# Patient Record
Sex: FEMALE | Race: WHITE | HISPANIC OR LATINO | Employment: FULL TIME | URBAN - METROPOLITAN AREA
[De-identification: names, ages, dates, MRNs, and addresses within clinical notes are randomized per-mention and may not be internally consistent; named-entity substitution may affect disease eponyms.]

---

## 2017-07-11 ENCOUNTER — HOSPITAL ENCOUNTER (EMERGENCY)
Facility: MEDICAL CENTER | Age: 32
End: 2017-07-11
Attending: EMERGENCY MEDICINE
Payer: MEDICAID

## 2017-07-11 ENCOUNTER — APPOINTMENT (OUTPATIENT)
Dept: RADIOLOGY | Facility: MEDICAL CENTER | Age: 32
End: 2017-07-11
Attending: EMERGENCY MEDICINE
Payer: MEDICAID

## 2017-07-11 VITALS
BODY MASS INDEX: 35.62 KG/M2 | RESPIRATION RATE: 14 BRPM | SYSTOLIC BLOOD PRESSURE: 142 MMHG | HEIGHT: 63 IN | TEMPERATURE: 96.5 F | DIASTOLIC BLOOD PRESSURE: 82 MMHG | WEIGHT: 201.06 LBS | OXYGEN SATURATION: 96 % | HEART RATE: 82 BPM

## 2017-07-11 DIAGNOSIS — S80.01XA CONTUSION OF RIGHT KNEE, INITIAL ENCOUNTER: ICD-10-CM

## 2017-07-11 DIAGNOSIS — K42.0 UMBILICAL HERNIA WITH OBSTRUCTION, WITHOUT GANGRENE: ICD-10-CM

## 2017-07-11 LAB
ALBUMIN SERPL BCP-MCNC: 3.7 G/DL (ref 3.2–4.9)
ALBUMIN/GLOB SERPL: 1.3 G/DL
ALP SERPL-CCNC: 81 U/L (ref 30–99)
ALT SERPL-CCNC: 28 U/L (ref 2–50)
ANION GAP SERPL CALC-SCNC: 5 MMOL/L (ref 0–11.9)
APPEARANCE UR: CLEAR
AST SERPL-CCNC: 20 U/L (ref 12–45)
BASOPHILS # BLD AUTO: 0.7 % (ref 0–1.8)
BASOPHILS # BLD: 0.05 K/UL (ref 0–0.12)
BILIRUB SERPL-MCNC: 0.5 MG/DL (ref 0.1–1.5)
BILIRUB UR QL STRIP.AUTO: NEGATIVE
BUN SERPL-MCNC: 6 MG/DL (ref 8–22)
CALCIUM SERPL-MCNC: 9 MG/DL (ref 8.5–10.5)
CHLORIDE SERPL-SCNC: 107 MMOL/L (ref 96–112)
CO2 SERPL-SCNC: 28 MMOL/L (ref 20–33)
COLOR UR: YELLOW
CREAT SERPL-MCNC: 0.48 MG/DL (ref 0.5–1.4)
CULTURE IF INDICATED INDCX: NO UA CULTURE
EOSINOPHIL # BLD AUTO: 0.16 K/UL (ref 0–0.51)
EOSINOPHIL NFR BLD: 2.1 % (ref 0–6.9)
ERYTHROCYTE [DISTWIDTH] IN BLOOD BY AUTOMATED COUNT: 43.1 FL (ref 35.9–50)
GFR SERPL CREATININE-BSD FRML MDRD: >60 ML/MIN/1.73 M 2
GLOBULIN SER CALC-MCNC: 2.9 G/DL (ref 1.9–3.5)
GLUCOSE SERPL-MCNC: 100 MG/DL (ref 65–99)
GLUCOSE UR STRIP.AUTO-MCNC: NEGATIVE MG/DL
HCG SERPL QL: NEGATIVE
HCT VFR BLD AUTO: 36.7 % (ref 37–47)
HGB BLD-MCNC: 12.2 G/DL (ref 12–16)
IMM GRANULOCYTES # BLD AUTO: 0.03 K/UL (ref 0–0.11)
IMM GRANULOCYTES NFR BLD AUTO: 0.4 % (ref 0–0.9)
KETONES UR STRIP.AUTO-MCNC: NEGATIVE MG/DL
LEUKOCYTE ESTERASE UR QL STRIP.AUTO: NEGATIVE
LIPASE SERPL-CCNC: 24 U/L (ref 11–82)
LYMPHOCYTES # BLD AUTO: 2.73 K/UL (ref 1–4.8)
LYMPHOCYTES NFR BLD: 35.6 % (ref 22–41)
MCH RBC QN AUTO: 29.1 PG (ref 27–33)
MCHC RBC AUTO-ENTMCNC: 33.2 G/DL (ref 33.6–35)
MCV RBC AUTO: 87.6 FL (ref 81.4–97.8)
MICRO URNS: NORMAL
MONOCYTES # BLD AUTO: 0.45 K/UL (ref 0–0.85)
MONOCYTES NFR BLD AUTO: 5.9 % (ref 0–13.4)
NEUTROPHILS # BLD AUTO: 4.24 K/UL (ref 2–7.15)
NEUTROPHILS NFR BLD: 55.3 % (ref 44–72)
NITRITE UR QL STRIP.AUTO: NEGATIVE
NRBC # BLD AUTO: 0 K/UL
NRBC BLD AUTO-RTO: 0 /100 WBC
PH UR STRIP.AUTO: 7 [PH]
PLATELET # BLD AUTO: 255 K/UL (ref 164–446)
PMV BLD AUTO: 11.1 FL (ref 9–12.9)
POTASSIUM SERPL-SCNC: 3.8 MMOL/L (ref 3.6–5.5)
PROT SERPL-MCNC: 6.6 G/DL (ref 6–8.2)
PROT UR QL STRIP: NEGATIVE MG/DL
RBC # BLD AUTO: 4.19 M/UL (ref 4.2–5.4)
RBC UR QL AUTO: NEGATIVE
SODIUM SERPL-SCNC: 140 MMOL/L (ref 135–145)
SP GR UR STRIP.AUTO: 1.02
UROBILINOGEN UR STRIP.AUTO-MCNC: 1 MG/DL
WBC # BLD AUTO: 7.7 K/UL (ref 4.8–10.8)

## 2017-07-11 PROCEDURE — 85025 COMPLETE CBC W/AUTO DIFF WBC: CPT

## 2017-07-11 PROCEDURE — 80053 COMPREHEN METABOLIC PANEL: CPT

## 2017-07-11 PROCEDURE — 84703 CHORIONIC GONADOTROPIN ASSAY: CPT

## 2017-07-11 PROCEDURE — 83690 ASSAY OF LIPASE: CPT

## 2017-07-11 PROCEDURE — 99284 EMERGENCY DEPT VISIT MOD MDM: CPT

## 2017-07-11 PROCEDURE — 81003 URINALYSIS AUTO W/O SCOPE: CPT

## 2017-07-11 PROCEDURE — 74177 CT ABD & PELVIS W/CONTRAST: CPT

## 2017-07-11 PROCEDURE — 73564 X-RAY EXAM KNEE 4 OR MORE: CPT | Mod: RT

## 2017-07-11 PROCEDURE — 36415 COLL VENOUS BLD VENIPUNCTURE: CPT

## 2017-07-11 PROCEDURE — 700117 HCHG RX CONTRAST REV CODE 255: Performed by: EMERGENCY MEDICINE

## 2017-07-11 RX ADMIN — IOHEXOL 100 ML: 350 INJECTION, SOLUTION INTRAVENOUS at 13:20

## 2017-07-11 ASSESSMENT — LIFESTYLE VARIABLES: DO YOU DRINK ALCOHOL: NO

## 2017-07-11 ASSESSMENT — PAIN SCALES - GENERAL: PAINLEVEL_OUTOF10: 8

## 2017-07-11 NOTE — ED AVS SNAPSHOT
7/11/2017    Misa Linda Lin  [address]  [place] [address]    Dear Misa:    [facility] wants to ensure your discharge home is safe and you or your loved ones have had all of your questions answered regarding your care after you leave the hospital.    Below is a list of resources and contact information should you have any questions regarding your hospital stay, follow-up instructions, or active medical symptoms.    Questions or Concerns Regarding… Contact   Medical Questions Related to Your Discharge  (7 days a week, 8am-5pm) Contact a Nurse Care Coordinator   [phone]   Medical Questions Not Related to Your Discharge  (24 hours a day / 7 days a week)  Contact the Nurse Health Line   [phone]    Medications or Discharge Instructions Refer to your discharge packet   or contact your [facility] Primary Care Provider   [phone]   Follow-up Appointment(s) Schedule your appointment via [employer]   or contact Scheduling [phone]   Billing Review your statement via [employer]  or contact Billing [phone]   Medical Records Review your records via [employer]   or contact Medical Records [phone]     You may receive a telephone call within two days of discharge. This call is to make certain you understand your discharge instructions and have the opportunity to have any questions answered. You can also easily access your medical information, test results and upcoming appointments via the [employer] free online health management tool. You can learn more and sign up at [employer]. For assistance setting up your [employer] account, please call [phone].    Once again, we want to ensure your discharge home is safe and that you have a clear understanding of any next steps in your care. If you have any questions or concerns, please do not hesitate to contact us, we are here for you. Thank you for choosing [facility] for your healthcare needs.    Sincerely,    Your [facility] Healthcare Team

## 2017-07-11 NOTE — LETTER
July 11, 2017    Patient: Misa Lin   YOB: 1985   Date of Visit: 7/11/2017 to July 11, 2017       To Whom It May Concern:    Misa Lin was seen and treated at a [facility].    She may return to work on 07/12/17.      Sincerely,     Dr. Odalys Mcmahon

## 2017-07-11 NOTE — DISCHARGE INSTRUCTIONS
Hernia, Adult  A hernia is the bulging of an organ or tissue through a weak spot in the muscles of the abdomen (abdominal wall). Hernias develop most often near the navel or groin.  There are many kinds of hernias. Common kinds include:  · Femoral hernia. This kind of hernia develops under the groin in the upper thigh area.  · Inguinal hernia. This kind of hernia develops in the groin or scrotum.  · Umbilical hernia. This kind of hernia develops near the navel.  · Hiatal hernia. This kind of hernia causes part of the stomach to be pushed up into the chest.  · Incisional hernia. This kind of hernia bulges through a scar from an abdominal surgery.  CAUSES  This condition may be caused by:  · Heavy lifting.  · Coughing over a long period of time.  · Straining to have a bowel movement.  · An incision made during an abdominal surgery.  · A birth defect (congenital defect).  · Excess weight or obesity.  · Smoking.  · Poor nutrition.  · Cystic fibrosis.  · Excess fluid in the abdomen.  · Undescended testicles.  SYMPTOMS  Symptoms of a hernia include:  · A lump on the abdomen. This is the first sign of a hernia. The lump may become more obvious with standing, straining, or coughing. It may get bigger over time if it is not treated or if the condition causing it is not treated.  · Pain. A hernia is usually painless, but it may become painful over time if treatment is delayed. The pain is usually dull and may get worse with standing or lifting heavy objects.  Sometimes a hernia gets tightly squeezed in the weak spot (strangulated) or stuck there (incarcerated) and causes additional symptoms. These symptoms may include:  · Vomiting.  · Nausea.  · Constipation.  · Irritability.  DIAGNOSIS  A hernia may be diagnosed with:  · A physical exam. During the exam your health care provider may ask you to cough or to make a specific movement, because a hernia is usually more visible when you move.  · Imaging tests. These can  include:  ¨ X-rays.  ¨ Ultrasound.  ¨ CT scan.  TREATMENT  A hernia that is small and painless may not need to be treated. A hernia that is large or painful may be treated with surgery. Inguinal hernias may be treated with surgery to prevent incarceration or strangulation. Strangulated hernias are always treated with surgery, because lack of blood to the trapped organ or tissue can cause it to die.  Surgery to treat a hernia involves pushing the bulge back into place and repairing the weak part of the abdomen.  HOME CARE INSTRUCTIONS  · Avoid straining.  · Do not lift anything heavier than 10 lb (4.5 kg).  · Lift with your leg muscles, not your back muscles. This helps avoid strain.  · When coughing, try to cough gently.  · Prevent constipation. Constipation leads to straining with bowel movements, which can make a hernia worse or cause a hernia repair to break down. You can prevent constipation by:  ¨ Eating a high-fiber diet that includes plenty of fruits and vegetables.  ¨ Drinking enough fluids to keep your urine clear or pale yellow. Aim to drink 6-8 glasses of water per day.  ¨ Using a stool softener as directed by your health care provider.  · Lose weight, if you are overweight.  · Do not use any tobacco products, including cigarettes, chewing tobacco, or electronic cigarettes. If you need help quitting, ask your health care provider.  · Keep all follow-up visits as directed by your health care provider. This is important. Your health care provider may need to monitor your condition.  SEEK MEDICAL CARE IF:  · You have swelling, redness, and pain in the affected area.  · Your bowel habits change.  SEEK IMMEDIATE MEDICAL CARE IF:  · You have a fever.  · You have abdominal pain that is getting worse.  · You feel nauseous or you vomit.  · You cannot push the hernia back in place by gently pressing on it while you are lying down.  · The hernia:  ¨ Changes in shape or size.  ¨ Is stuck outside the  abdomen.  ¨ Becomes discolored.  ¨ Feels hard or tender.     This information is not intended to replace advice given to you by your health care provider. Make sure you discuss any questions you have with your health care provider.     Document Released: 12/18/2006 Document Revised: 01/08/2016 Document Reviewed: 10/28/2015  [employer] Interactive Patient Education ©2016 [employer]    Contusion  A contusion is a deep bruise. Contusions are the result of an injury that caused bleeding under the skin. The contusion may turn blue, purple, or yellow. Minor injuries will give you a painless contusion, but more severe contusions may stay painful and swollen for a few weeks.   CAUSES   A contusion is usually caused by a blow, trauma, or direct force to an area of the body.  SYMPTOMS   · Swelling and redness of the injured area.  · Bruising of the injured area.  · Tenderness and soreness of the injured area.  · Pain.  DIAGNOSIS   The diagnosis can be made by taking a history and physical exam. An X-ray, CT scan, or MRI may be needed to determine if there were any associated injuries, such as fractures.  TREATMENT   Specific treatment will depend on what area of the body was injured. In general, the best treatment for a contusion is resting, icing, elevating, and applying cold compresses to the injured area. Over-the-counter medicines may also be recommended for pain control. Ask your caregiver what the best treatment is for your contusion.  HOME CARE INSTRUCTIONS   · Put ice on the injured area.  ¨ Put ice in a plastic bag.  ¨ Place a towel between your skin and the bag.  ¨ Leave the ice on for 15-20 minutes, 3-4 times a day, or as directed by your health care provider.  · Only take over-the-counter or prescription medicines for pain, discomfort, or fever as directed by your caregiver. Your caregiver may recommend avoiding anti-inflammatory medicines (aspirin, ibuprofen, and naproxen) for 48 hours because these medicines  may increase bruising.  · Rest the injured area.  · If possible, elevate the injured area to reduce swelling.  SEEK IMMEDIATE MEDICAL CARE IF:   · You have increased bruising or swelling.  · You have pain that is getting worse.  · Your swelling or pain is not relieved with medicines.  MAKE SURE YOU:   · Understand these instructions.  · Will watch your condition.  · Will get help right away if you are not doing well or get worse.     This information is not intended to replace advice given to you by your health care provider. Make sure you discuss any questions you have with your health care provider.     Document Released: 09/27/2006 Document Revised: 12/23/2014 Document Reviewed: 10/22/2012  [employer] Interactive Patient Education ©2016 [employer] Inc.

## 2017-07-11 NOTE — ED AVS SNAPSHOT
Home Care Instructions                                                                                                                Misa Lin   MRN: [id number]    Department:  [facility], Emergency Dept   Date of Visit:  7/11/2017            [facility], Emergency Dept    [address]    [place] [address]    Phone:  [phone]      You were seen by     Phoenix Ptit M.D.      Your Diagnosis Was     Contusion of right knee, initial encounter     S80.01XA       These are the medications you received during your hospitalization from 07/11/2017 0943 to 07/11/2017 1351     Date/Time Order Dose Route Action    07/11/2017 1320 iohexol (OMNIPAQUE) 350 mg/mL 100 mL Intravenous Given      Follow-up Information     1. Follow up with Neri Huggins M.D..    Specialties:  Surgery, Radiology    Why:  As needed for follow-up of umbilical hernia    Contact information    Zainab Watters [facility] [address]  [place] [id number] [phone]        Medication Information     Review all of your home medications and newly ordered medications with your primary doctor and/or pharmacist as soon as possible. Follow medication instructions as directed by your doctor and/or pharmacist.     Please keep your complete medication list with you and share with your physician. Update the information when medications are discontinued, doses are changed, or new medications (including over-the-counter products) are added; and carry medication information at all times in the event of emergency situations.               Medication List      Notice     You have not been prescribed any medications.            Procedures and tests performed during your visit     CBC WITH DIFFERENTIAL    COMP METABOLIC PANEL    CONSENT FOR CONTRAST INJECTION    CT-ABDOMEN-PELVIS WITH    DX-KNEE COMPLETE 4+ RIGHT    ESTIMATED GFR    HCG QUAL SERUM    LIPASE    SALINE LOCK    URINALYSIS,CULTURE IF INDICATED             Discharge Instructions       Hernia, Adult  A hernia is the bulging of an organ or tissue through a weak spot in the muscles of the abdomen (abdominal wall). Hernias develop most often near the navel or groin.  There are many kinds of hernias. Common kinds include:  · Femoral hernia. This kind of hernia develops under the groin in the upper thigh area.  · Inguinal hernia. This kind of hernia develops in the groin or scrotum.  · Umbilical hernia. This kind of hernia develops near the navel.  · Hiatal hernia. This kind of hernia causes part of the stomach to be pushed up into the chest.  · Incisional hernia. This kind of hernia bulges through a scar from an abdominal surgery.  CAUSES  This condition may be caused by:  · Heavy lifting.  · Coughing over a long period of time.  · Straining to have a bowel movement.  · An incision made during an abdominal surgery.  · A birth defect (congenital defect).  · Excess weight or obesity.  · Smoking.  · Poor nutrition.  · Cystic fibrosis.  · Excess fluid in the abdomen.  · Undescended testicles.  SYMPTOMS  Symptoms of a hernia include:  · A lump on the abdomen. This is the first sign of a hernia. The lump may become more obvious with standing, straining, or coughing. It may get bigger over time if it is not treated or if the condition causing it is not treated.  · Pain. A hernia is usually painless, but it may become painful over time if treatment is delayed. The pain is usually dull and may get worse with standing or lifting heavy objects.  Sometimes a hernia gets tightly squeezed in the weak spot (strangulated) or stuck there (incarcerated) and causes additional symptoms. These symptoms may include:  · Vomiting.  · Nausea.  · Constipation.  · Irritability.  DIAGNOSIS  A hernia may be diagnosed with:  · A physical exam. During the exam your health care provider may ask you to cough or to make a specific movement, because a hernia is usually more visible when you  move.  · Imaging tests. These can include:  ¨ X-rays.  ¨ Ultrasound.  ¨ CT scan.  TREATMENT  A hernia that is small and painless may not need to be treated. A hernia that is large or painful may be treated with surgery. Inguinal hernias may be treated with surgery to prevent incarceration or strangulation. Strangulated hernias are always treated with surgery, because lack of blood to the trapped organ or tissue can cause it to die.  Surgery to treat a hernia involves pushing the bulge back into place and repairing the weak part of the abdomen.  HOME CARE INSTRUCTIONS  · Avoid straining.  · Do not lift anything heavier than 10 lb (4.5 kg).  · Lift with your leg muscles, not your back muscles. This helps avoid strain.  · When coughing, try to cough gently.  · Prevent constipation. Constipation leads to straining with bowel movements, which can make a hernia worse or cause a hernia repair to break down. You can prevent constipation by:  ¨ Eating a high-fiber diet that includes plenty of fruits and vegetables.  ¨ Drinking enough fluids to keep your urine clear or pale yellow. Aim to drink 6-8 glasses of water per day.  ¨ Using a stool softener as directed by your health care provider.  · Lose weight, if you are overweight.  · Do not use any tobacco products, including cigarettes, chewing tobacco, or electronic cigarettes. If you need help quitting, ask your health care provider.  · Keep all follow-up visits as directed by your health care provider. This is important. Your health care provider may need to monitor your condition.  SEEK MEDICAL CARE IF:  · You have swelling, redness, and pain in the affected area.  · Your bowel habits change.  SEEK IMMEDIATE MEDICAL CARE IF:  · You have a fever.  · You have abdominal pain that is getting worse.  · You feel nauseous or you vomit.  · You cannot push the hernia back in place by gently pressing on it while you are lying down.  · The hernia:  ¨ Changes in shape or size.  ¨ Is  stuck outside the abdomen.  ¨ Becomes discolored.  ¨ Feels hard or tender.     This information is not intended to replace advice given to you by your health care provider. Make sure you discuss any questions you have with your health care provider.     Document Released: 12/18/2006 Document Revised: 01/08/2016 Document Reviewed: 10/28/2015  [employer] Interactive Patient Education ©2016 [employer]    Contusion  A contusion is a deep bruise. Contusions are the result of an injury that caused bleeding under the skin. The contusion may turn blue, purple, or yellow. Minor injuries will give you a painless contusion, but more severe contusions may stay painful and swollen for a few weeks.   CAUSES   A contusion is usually caused by a blow, trauma, or direct force to an area of the body.  SYMPTOMS   · Swelling and redness of the injured area.  · Bruising of the injured area.  · Tenderness and soreness of the injured area.  · Pain.  DIAGNOSIS   The diagnosis can be made by taking a history and physical exam. An X-ray, CT scan, or MRI may be needed to determine if there were any associated injuries, such as fractures.  TREATMENT   Specific treatment will depend on what area of the body was injured. In general, the best treatment for a contusion is resting, icing, elevating, and applying cold compresses to the injured area. Over-the-counter medicines may also be recommended for pain control. Ask your caregiver what the best treatment is for your contusion.  HOME CARE INSTRUCTIONS   · Put ice on the injured area.  ¨ Put ice in a plastic bag.  ¨ Place a towel between your skin and the bag.  ¨ Leave the ice on for 15-20 minutes, 3-4 times a day, or as directed by your health care provider.  · Only take over-the-counter or prescription medicines for pain, discomfort, or fever as directed by your caregiver. Your caregiver may recommend avoiding anti-inflammatory medicines (aspirin, ibuprofen, and naproxen) for 48 hours because  these medicines may increase bruising.  · Rest the injured area.  · If possible, elevate the injured area to reduce swelling.  SEEK IMMEDIATE MEDICAL CARE IF:   · You have increased bruising or swelling.  · You have pain that is getting worse.  · Your swelling or pain is not relieved with medicines.  MAKE SURE YOU:   · Understand these instructions.  · Will watch your condition.  · Will get help right away if you are not doing well or get worse.     This information is not intended to replace advice given to you by your health care provider. Make sure you discuss any questions you have with your health care provider.     Document Released: 09/27/2006 Document Revised: 12/23/2014 Document Reviewed: 10/22/2012  [employer] Interactive Patient Education ©2016 [employer].            Patient Information     Patient Information    Following emergency treatment: all patient requiring follow-up care must return either to a private physician or a clinic if your condition worsens before you are able to obtain further medical attention, please return to the emergency room.     Billing Information    At [facility], we work to make the billing process streamlined for our patients.  Our Representatives are here to answer any questions you may have regarding your hospital bill.  If you have insurance coverage and have supplied your insurance information to us, we will submit a claim to your insurer on your behalf.  Should you have any questions regarding your bill, we can be reached online or by phone as follows:  Online: You are able pay your bills online or live chat with our representatives about any billing questions you may have. We are here to help Monday - Friday from 8:00am to 7:30pm and 9:00am - 12:00pm on Saturdays.  Please visit [email or web address]  for more information.   Phone:  [phone] or [phone]    Please note that your emergency physician, surgeon, pathologist,  radiologist, anesthesiologist, and other specialists are not employed by [facility] and will therefore bill separately for their services.  Please contact them directly for any questions concerning their bills at the numbers below:     Emergency Physician Services:  [phone]  [facility]:  [phone]  [facility]:  [phone]  [facility]:  [phone]    1. Your final bill may vary from the amount quoted upon discharge if all procedures are not complete at that time, or if your doctor has additional procedures of which we are not aware. You will receive an additional bill if you return to the Emergency Department at [facility] for suture removal regardless of the facility of which the sutures were placed.     2. Please arrange for settlement of this account at the emergency registration.    3. All self-pay accounts are due in full at the time of treatment.  If you are unable to meet this obligation then payment is expected within 4-5 days.     4. If you have had radiology studies (CT, X-ray, Ultrasound, MRI), you have received a preliminary result during your emergency department visit. Please contact the radiology department [phone] to receive a copy of your final result. Please discuss the Final result with your primary physician or with the follow up physician provided.     Crisis Hotline:  [place] Crisis Hotline:  4-903-CEDWPWL or 1-941.416.8361  [place] Crisis Hotline:    1-646.415.7028 or 599-385-5548         ED Discharge Follow Up Questions    1. In order to provide you with very good care, we would like to follow up with a phone call in the next few days.  May we have your permission to contact you?     YES /  NO    2. What is the best phone number to call you? (       )_____-__________    3. What is the best time to call you?      Morning  /  Afternoon  /  Evening                   Patient Signature:   ____________________________________________________________    Date:  ____________________________________________________________

## 2017-07-11 NOTE — ED AVS SNAPSHOT
[employer] Access Code: [email or web address]  Expires: 8/10/2017  1:50 PM    [employer]  A secure, online tool to manage your health information     [employer]s [employer]® is a secure, online tool that connects you to your personalized health information from the privacy of your home -- day or night - making it very easy for you to manage your healthcare. Once the activation process is completed, you can even access your medical information using the [employer] mary, which is available for free in the [employer] Mary store or Google Play store.     [employer] provides the following levels of access (as shown below):   My Chart Features   [facility] Primary Care Doctor [facility]  Specialists [facility]  Urgent  Care Non-[facility]  Primary Care  Doctor   Email your healthcare team securely and privately 24/7 X X X X   Manage appointments: schedule your next appointment; view details of past/upcoming appointments X      Request prescription refills. X      View recent personal medical records, including lab and immunizations X X X X   View health record, including health history, allergies, medications X X X X   Read reports about your outpatient visits, procedures, consult and ER notes X X X X   See your discharge summary, which is a recap of your hospital and/or ER visit that includes your diagnosis, lab results, and care plan. X X       How to register for [employer]:  1. Go to  [email or web address].  2. Click on the Sign Up Now box, which takes you to the New Member Sign Up page. You will need to provide the following information:  a. Enter your [employer] Access Code exactly as it appears at the top of this page. (You will not need to use this code after you’ve completed the sign-up process. If you do not sign up before the expiration date, you must request a new code.)   b. Enter your date of birth.   c. Enter your home email address.   d. Click Submit, and follow the next screen’s instructions.  3. Create a [employer] ID. This will be your [employer]  login ID and cannot be changed, so think of one that is secure and easy to remember.  4. Create a [employer] password. You can change your password at any time.  5. Enter your Password Reset Question and Answer. This can be used at a later time if you forget your password.   6. Enter your e-mail address. This allows you to receive e-mail notifications when new information is available in [employer].  7. Click Sign Up. You can now view your health information.    For assistance activating your [employer] account, call [phone]

## 2017-07-11 NOTE — ED NOTES
Chief Complaint   Patient presents with   • Abdominal Pain     Pt BIB self to triage, pt reports mid/left abd pain, pt reports hx of hernia in past, pt denies N/V/D.    • Knee Pain     Pt reports GLF/tripped of Fri, right knee pain.     Explained to pt triage process, made pt aware to tell this RN of any changes/concerns, pt verbalized understanding of process and instructions given. Pt to ER lobby.

## 2017-07-11 NOTE — ED NOTES
All lines and monitors d/c'd. Discharge paperwork and medications reviewed. Pt states she understands and had no questions. Pt encouraged to follow-up with PCP and come back to ER with worsening symptoms.  Pt verbalizes understanding. Pt ambulated out of ED with steady gait.
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
## 2017-07-14 ENCOUNTER — HOSPITAL ENCOUNTER (EMERGENCY)
Facility: MEDICAL CENTER | Age: 32
End: 2017-07-14
Attending: EMERGENCY MEDICINE
Payer: MEDICAID

## 2017-07-14 VITALS
DIASTOLIC BLOOD PRESSURE: 90 MMHG | WEIGHT: 197.53 LBS | HEART RATE: 85 BPM | TEMPERATURE: 98.4 F | OXYGEN SATURATION: 96 % | RESPIRATION RATE: 18 BRPM | SYSTOLIC BLOOD PRESSURE: 135 MMHG | BODY MASS INDEX: 35 KG/M2

## 2017-07-14 DIAGNOSIS — Z23 DIPHTHERIA-TETANUS-PERTUSSIS (DTP) VACCINATION: ICD-10-CM

## 2017-07-14 DIAGNOSIS — S61.411A LACERATION OF RIGHT HAND WITHOUT FOREIGN BODY, INITIAL ENCOUNTER: ICD-10-CM

## 2017-07-14 PROCEDURE — 700101 HCHG RX REV CODE 250: Performed by: EMERGENCY MEDICINE

## 2017-07-14 PROCEDURE — 303485 HCHG DRESSING MEDIUM

## 2017-07-14 PROCEDURE — 700111 HCHG RX REV CODE 636 W/ 250 OVERRIDE (IP): Performed by: EMERGENCY MEDICINE

## 2017-07-14 PROCEDURE — 304217 HCHG IRRIGATION SYSTEM

## 2017-07-14 PROCEDURE — 90471 IMMUNIZATION ADMIN: CPT

## 2017-07-14 PROCEDURE — 90715 TDAP VACCINE 7 YRS/> IM: CPT | Performed by: EMERGENCY MEDICINE

## 2017-07-14 PROCEDURE — 303747 HCHG EXTRA SUTURE

## 2017-07-14 PROCEDURE — 99283 EMERGENCY DEPT VISIT LOW MDM: CPT

## 2017-07-14 PROCEDURE — 304999 HCHG REPAIR-SIMPLE/INTERMED LEVEL 1

## 2017-07-14 RX ORDER — LIDOCAINE HYDROCHLORIDE 10 MG/ML
20 INJECTION, SOLUTION INFILTRATION; PERINEURAL ONCE
Status: COMPLETED | OUTPATIENT
Start: 2017-07-14 | End: 2017-07-14

## 2017-07-14 RX ADMIN — LIDOCAINE HYDROCHLORIDE 20 ML: 10 INJECTION, SOLUTION INFILTRATION; PERINEURAL at 10:27

## 2017-07-14 RX ADMIN — CLOSTRIDIUM TETANI TOXOID ANTIGEN (FORMALDEHYDE INACTIVATED), CORYNEBACTERIUM DIPHTHERIAE TOXOID ANTIGEN (FORMALDEHYDE INACTIVATED), BORDETELLA PERTUSSIS TOXOID ANTIGEN (GLUTARALDEHYDE INACTIVATED), BORDETELLA PERTUSSIS FILAMENTOUS HEMAGGLUTININ ANTIGEN (FORMALDEHYDE INACTIVATED), BORDETELLA PERTUSSIS PERTACTIN ANTIGEN, AND BORDETELLA PERTUSSIS FIMBRIAE 2/3 ANTIGEN 0.5 ML: 5; 2; 2.5; 5; 3; 5 INJECTION, SUSPENSION INTRAMUSCULAR at 10:47

## 2017-07-14 ASSESSMENT — ENCOUNTER SYMPTOMS
FALLS: 0
FEVER: 0
VOMITING: 0

## 2017-07-14 ASSESSMENT — LIFESTYLE VARIABLES: DO YOU DRINK ALCOHOL: NO

## 2017-07-14 NOTE — ED AVS SNAPSHOT
NudgeRx Access Code: [email or web address]  Expires: 8/10/2017  1:50 PM    NudgeRx  A secure, online tool to manage your health information     [employer]’s NudgeRx® is a secure, online tool that connects you to your personalized health information from the privacy of your home -- day or night - making it very easy for you to manage your healthcare. Once the activation process is completed, you can even access your medical information using the NudgeRx mary, which is available for free in the [employer] Mary store or Google Play store.     NudgeRx provides the following levels of access (as shown below):   My Chart Features   [facility] Primary Care Doctor [facility]  Specialists [facility]  Urgent  Care Non-[facility]  Primary Care  Doctor   Email your healthcare team securely and privately 24/7 X X X X   Manage appointments: schedule your next appointment; view details of past/upcoming appointments X      Request prescription refills. X      View recent personal medical records, including lab and immunizations X X X X   View health record, including health history, allergies, medications X X X X   Read reports about your outpatient visits, procedures, consult and ER notes X X X X   See your discharge summary, which is a recap of your hospital and/or ER visit that includes your diagnosis, lab results, and care plan. X X       How to register for NudgeRx:  1. Go to  [email or web address].  2. Click on the Sign Up Now box, which takes you to the New Member Sign Up page. You will need to provide the following information:  a. Enter your NudgeRx Access Code exactly as it appears at the top of this page. (You will not need to use this code after you’ve completed the sign-up process. If you do not sign up before the expiration date, you must request a new code.)   b. Enter your date of birth.   c. Enter your home email address.   d. Click Submit, and follow the next screen’s instructions.  3. Create a NudgeRx ID. This will be your NudgeRx  login ID and cannot be changed, so think of one that is secure and easy to remember.  4. Create a [employer] password. You can change your password at any time.  5. Enter your Password Reset Question and Answer. This can be used at a later time if you forget your password.   6. Enter your e-mail address. This allows you to receive e-mail notifications when new information is available in [employer].  7. Click Sign Up. You can now view your health information.    For assistance activating your [employer] account, call [phone]

## 2017-07-14 NOTE — ED NOTES
Discharge instructions given to patient; patient verbalizes understanding, all questions answered.  Copy of DC summary provided, signed copy in chart.  Pt states personal belongings are in possession.  Pt escorted to ED lobby without incident.

## 2017-07-14 NOTE — ED AVS SNAPSHOT
Home Care Instructions                                                                                                                Misa Lin   MRN: [id number]    Department:  [facility], Emergency Dept   Date of Visit:  7/14/2017            [facility], Emergency Dept    [address] [place] [id number]    Phone:  [phone]      You were seen by     Isadora Cuevas D.O.      Your Diagnosis Was     Laceration of right hand without foreign body, initial encounter     S61.411A dorsum      These are the medications you received during your hospitalization from 07/14/2017 0927 to 07/14/2017 1055     Date/Time Order Dose Route Action    07/14/2017 1027 lidocaine (XYLOCAINE) 1%  injection 20 mL Infiltration Given    07/14/2017 1047 tetanus-dipth-acell pertussis (ADACEL) inj 0.5 mL 0.5 mL Intramuscular Given      Follow-up Information     1. Follow up with Your Physician In 2 days.    Specialty:  Emergency Medicine    Why:  for wound check    Contact information    Varies          2. Follow up with [facility], Emergency Dept.    Specialty:  Emergency Medicine    Why:  If symptoms worsen    Contact information    [address] [place] [id number] [phone]      Medication Information     Review all of your home medications and newly ordered medications with your primary doctor and/or pharmacist as soon as possible. Follow medication instructions as directed by your doctor and/or pharmacist.     Please keep your complete medication list with you and share with your physician. Update the information when medications are discontinued, doses are changed, or new medications (including over-the-counter products) are added; and carry medication information at all times in the event of emergency situations.               Medication List      Notice     You have not been prescribed any medications.              Discharge Instructions          Sutures will need to be removed in about 7 days.    Laceration Care, Adult  A laceration is a cut that goes through all of the layers of the skin and into the tissue that is right under the skin. Some lacerations heal on their own. Others need to be closed with stitches (sutures), staples, skin adhesive strips, or skin glue. Proper laceration care minimizes the risk of infection and helps the laceration to heal better.  HOW TO CARE FOR YOUR LACERATION  If sutures or staples were used:  · Keep the wound clean and dry.  · If you were given a bandage (dressing), you should change it at least one time per day or as told by your health care provider. You should also change it if it becomes wet or dirty.  · Keep the wound completely dry for the first 24 hours or as told by your health care provider. After that time, you may shower or bathe. However, make sure that the wound is not soaked in water until after the sutures or staples have been removed.  · Clean the wound one time each day or as told by your health care provider:  ¨ Wash the wound with soap and water.  ¨ Rinse the wound with water to remove all soap.  ¨ Pat the wound dry with a clean towel. Do not rub the wound.  · After cleaning the wound, apply a thin layer of antibiotic ointment as told by your health care provider. This will help to prevent infection and keep the dressing from sticking to the wound.  · Have the sutures or staples removed as told by your health care provider.  If skin adhesive strips were used:  · Keep the wound clean and dry.  · If you were given a bandage (dressing), you should change it at least one time per day or as told by your health care provider. You should also change it if it becomes dirty or wet.  · Do not get the skin adhesive strips wet. You may shower or bathe, but be careful to keep the wound dry.  · If the wound gets wet, pat it dry with a clean towel. Do not rub the wound.  · Skin adhesive strips fall off on their own.  You may trim the strips as the wound heals. Do not remove skin adhesive strips that are still stuck to the wound. They will fall off in time.  If skin glue was used:  · Try to keep the wound dry, but you may briefly wet it in the shower or bath. Do not soak the wound in water, such as by swimming.  · After you have showered or bathed, gently pat the wound dry with a clean towel. Do not rub the wound.  · Do not do any activities that will make you sweat heavily until the skin glue has fallen off on its own.  · Do not apply liquid, cream, or ointment medicine to the wound while the skin glue is in place. Using those may loosen the film before the wound has healed.  · If you were given a bandage (dressing), you should change it at least one time per day or as told by your health care provider. You should also change it if it becomes dirty or wet.  · If a dressing is placed over the wound, be careful not to apply tape directly over the skin glue. Doing that may cause the glue to be pulled off before the wound has healed.  · Do not pick at the glue. The skin glue usually remains in place for 5-10 days, then it falls off of the skin.  General Instructions  · Take over-the-counter and prescription medicines only as told by your health care provider.  · If you were prescribed an antibiotic medicine or ointment, take or apply it as told by your doctor. Do not stop using it even if your condition improves.  · To help prevent scarring, make sure to cover your wound with sunscreen whenever you are outside after stitches are removed, after adhesive strips are removed, or when glue remains in place and the wound is healed. Make sure to wear a sunscreen of at least 30 SPF.  · Do not scratch or pick at the wound.  · Keep all follow-up visits as told by your health care provider. This is important.  · Check your wound every day for signs of infection. Watch for:  ¨ Redness, swelling, or pain.  ¨ Fluid, blood, or pus.  · Raise  (elevate) the injured area above the level of your heart while you are sitting or lying down, if possible.  SEEK MEDICAL CARE IF:  · You received a tetanus shot and you have swelling, severe pain, redness, or bleeding at the injection site.  · You have a fever.  · A wound that was closed breaks open.  · You notice a bad smell coming from your wound or your dressing.  · You notice something coming out of the wound, such as wood or glass.  · Your pain is not controlled with medicine.  · You have increased redness, swelling, or pain at the site of your wound.  · You have fluid, blood, or pus coming from your wound.  · You notice a change in the color of your skin near your wound.  · You need to change the dressing frequently due to fluid, blood, or pus draining from the wound.  · You develop a new rash.  · You develop numbness around the wound.  SEEK IMMEDIATE MEDICAL CARE IF:  · You develop severe swelling around the wound.  · Your pain suddenly increases and is severe.  · You develop painful lumps near the wound or on skin that is anywhere on your body.  · You have a red streak going away from your wound.  · The wound is on your hand or foot and you cannot properly move a finger or toe.  · The wound is on your hand or foot and you notice that your fingers or toes look pale or bluish.     This information is not intended to replace advice given to you by your health care provider. Make sure you discuss any questions you have with your health care provider.     Document Released: 12/18/2006 Document Revised: 05/03/2016 Document Reviewed: 12/14/2015  [employer] Interactive Patient Education ©2016 [employer]            Patient Information     Patient Information    Following emergency treatment: all patient requiring follow-up care must return either to a private physician or a clinic if your condition worsens before you are able to obtain further medical attention, please return to the emergency room.     Billing  Information    At [facility], we work to make the billing process streamlined for our patients.  Our Representatives are here to answer any questions you may have regarding your hospital bill.  If you have insurance coverage and have supplied your insurance information to us, we will submit a claim to your insurer on your behalf.  Should you have any questions regarding your bill, we can be reached online or by phone as follows:  Online: You are able pay your bills online or live chat with our representatives about any billing questions you may have. We are here to help Monday - Friday from 8:00am to 7:30pm and 9:00am - 12:00pm on Saturdays.  Please visit [email or web address]  for more information.   Phone:  [phone] or [phone]    Please note that your emergency physician, surgeon, pathologist, radiologist, anesthesiologist, and other specialists are not employed by [facility] and will therefore bill separately for their services.  Please contact them directly for any questions concerning their bills at the numbers below:     Emergency Physician Services:  [phone]  [facility]:  [phone]  [facility]:  [phone]  [facility]:  [phone]    1. Your final bill may vary from the amount quoted upon discharge if all procedures are not complete at that time, or if your doctor has additional procedures of which we are not aware. You will receive an additional bill if you return to the Emergency Department at [facility] for suture removal regardless of the facility of which the sutures were placed.     2. Please arrange for settlement of this account at the emergency registration.    3. All self-pay accounts are due in full at the time of treatment.  If you are unable to meet this obligation then payment is expected within 4-5 days.     4. If you have had radiology studies (CT, X-ray, Ultrasound, MRI), you have  received a preliminary result during your emergency department visit. Please contact the radiology department [phone] to receive a copy of your final result. Please discuss the Final result with your primary physician or with the follow up physician provided.     Crisis Hotline:  [place] Crisis Hotline:  5-570-AMAIKPN or 1-485.171.7485  [place] Crisis Hotline:    1-106.260.6799 or 961-477-3026         ED Discharge Follow Up Questions    1. In order to provide you with very good care, we would like to follow up with a phone call in the next few days.  May we have your permission to contact you?     YES /  NO    2. What is the best phone number to call you? (       )_____-__________    3. What is the best time to call you?      Morning  /  Afternoon  /  Evening                   Patient Signature:  ____________________________________________________________    Date:  ____________________________________________________________

## 2017-07-14 NOTE — ED PROVIDER NOTES
ED Provider Note    Scribed for Isadora Cuevas D.O. by Jose Flores. 7/14/2017, 10:16 AM.    Primary care provider: [facility]  Means of arrival: walk-in  History obtained from: patient  History limited by: none    CHIEF COMPLAINT  Chief Complaint   Patient presents with   • Hand Laceration     top of R hand, cut it on a broken window       HPI  Misa Lin is a 31 y.o. Right handed female who presents to the Emergency Department for evaluation of a right hand laceration, with associated right hand pain, onset less than 30 minutes prior to arrival. She reports she was using a key to knock on a window when the glass broke causing her hand to go through the window. She has no other injuries or complaints at this time.  Her tetanus vaccination is not up to date, despite traige note, she is not sure is was given 3 years ago when she delivered. She has no exacerbating or alleviating factors.     REVIEW OF SYSTEMS  Review of Systems   Constitutional: Negative for fever.   Cardiovascular: Negative for chest pain.   Gastrointestinal: Negative for vomiting.   Musculoskeletal: Negative for falls.        Positive right hand pain   E.     PAST MEDICAL HISTORY   has a past medical history of Anemia (2006,2009); GERD (gastroesophageal reflux disease); and Blood transfusion (2006).    SURGICAL HISTORY   has past surgical history that includes other; abdominal exploration; primary c section; repeat c section (6/11/2014); and umbilical hernia repair (3/10/2015).    SOCIAL HISTORY  Social History   Substance Use Topics   • Smoking status: Never Smoker    • Smokeless tobacco: Never Used   • Alcohol Use: No      History   Drug Use No       FAMILY HISTORY  Family History   Problem Relation Age of Onset   • Hypertension Mother    • Diabetes Mother    • Other Mother        CURRENT MEDICATIONS  Home Medications     Reviewed by Dean Guerrero R.N. (Registered Nurse) on 07/14/17 at 0954  Med List Status: Not  Addressed    Medication Last Dose Status          Patient Jeovany Taking any Medications                        ALLERGIES  Allergies   Allergen Reactions   • Shrimp Flavor Anaphylaxis       PHYSICAL EXAM  VITAL SIGNS: /98 mmHg  Pulse 91  Resp 16  Wt 89.6 kg (197 lb 8.5 oz)  SpO2 96%  LMP 07/10/2017  Breastfeeding? Unknown  Vitals reviewed.  Constitutional: Patient is oriented to person, place, and time. Appears well-developed and well-nourished. No distress.    Cardiovascular: Normal rate, regular rhythm and normal heart sounds. Normal peripheral pulses.  Pulmonary/Chest: Effort normal and breath sounds normal. No respiratory distress, no wheezes, rhonchi, or rales.  Musculoskeletal: Minor proximal lacerations 5th finger and distal index finger. Laceration to dorsum of right hand over 4th and 5th MCs. No functional deficits. No visualized tendons. No foreign bodies. No edema.   Skin: Skin is warm and dry. No erythema. No pallor.   Psychiatric: Patient has a normal mood and affect.     DIAGNOSTICS/PROCEDURES  Laceration Repair Procedure Note    Indication: Laceration    Procedure: The patient was placed in the appropriate position and anesthesia around the laceration was obtained by infiltration using 1% Lidocaine without epinephrine. The area was then cleansed using normal saline solution. No foreign body identified. The laceration was closed with 5-0 Ethilon using interrupted sutures. There were no additional lacerations requiring repair. The wound area was then dressed with a bandage.      Total repaired wound length: 3 cm.     Other Items: Suture count: 6    The patient tolerated the procedure well.    Complications: None    COURSE & MEDICAL DECISION MAKING  Pertinent Labs & Imaging studies reviewed. (See chart for details)    Obtained and reviewed past medical records from delivery in 2014, but unable to see record of Tdap given.     10:16 AM - Patient seen and examined at bedside. Patient will be  treated with Xylocaine 1%, Adacel. Will numb patient's right hand, clean and repair. Patient's tendons appear normal. She understands and is agreeable.     10:28 AM performed laceration procedure. Please see above procedure note for details. Patient's given laceration care instructions. She was given information/instructions about hand lacerations. She is to follow up with her physician in 2 days. Patient understands the plan of care and is agreeable. She agrees to be discharged home.     The patient will return for new or worsening symptoms and is stable at the time of discharge.    Please follow up with a primary physician for blood pressure management, diabetic screening, and all other preventive health concerns.     DISPOSITION:  Patient will be discharged home in stable condition.    FOLLOW UP:  Your Physician  Varies    In 2 days  for wound check    [facility], Emergency Dept  [address] [place] [id number] [phone]    If symptoms worsen      OUTPATIENT MEDICATIONS:  There are no discharge medications for this patient.          FINAL IMPRESSION  1. Laceration of right hand without foreign body, initial encounter    2. Diphtheria-tetanus-pertussis (DTP) vaccination          Jose MORALES (Keri), am scribing for, and in the presence of, Isadora Cuevas D.O..    Electronically signed by: Jose Flores (Keri), 7/14/2017    Isadora MORALES D.O. personally performed the services described in this documentation, as scribed by Jose Flores in my presence, and it is both accurate and complete.    The note accurately reflects work and decisions made by me.  Isadora Cuevas  7/14/2017  1:20 PM

## 2017-07-14 NOTE — ED AVS SNAPSHOT
7/14/2017    Misa Linda Lin  [address]  [place] [address]    Dear Misa:    [facility] wants to ensure your discharge home is safe and you or your loved ones have had all of your questions answered regarding your care after you leave the hospital.    Below is a list of resources and contact information should you have any questions regarding your hospital stay, follow-up instructions, or active medical symptoms.    Questions or Concerns Regarding… Contact   Medical Questions Related to Your Discharge  (7 days a week, 8am-5pm) Contact a Nurse Care Coordinator   [phone]   Medical Questions Not Related to Your Discharge  (24 hours a day / 7 days a week)  Contact the Nurse Health Line   [phone]    Medications or Discharge Instructions Refer to your discharge packet   or contact your [facility] Primary Care Provider   [phone]   Follow-up Appointment(s) Schedule your appointment via [employer]   or contact Scheduling [phone]   Billing Review your statement via [employer]  or contact Billing [phone]   Medical Records Review your records via [employer]   or contact Medical Records [phone]     You may receive a telephone call within two days of discharge. This call is to make certain you understand your discharge instructions and have the opportunity to have any questions answered. You can also easily access your medical information, test results and upcoming appointments via the [employer] free online health management tool. You can learn more and sign up at [employer]. For assistance setting up your [employer] account, please call [phone].    Once again, we want to ensure your discharge home is safe and that you have a clear understanding of any next steps in your care. If you have any questions or concerns, please do not hesitate to contact us, we are here for you. Thank you for choosing [facility] for your healthcare needs.    Sincerely,    Your [facility] Healthcare Team

## 2017-07-14 NOTE — ED NOTES
Laceration wrapped at bedside, informed pt how to clean it and wrap on her own and check for signs of infection. Pt understood.

## 2017-07-14 NOTE — ED NOTES
Ambulates to triage  Chief Complaint   Patient presents with   • Hand Laceration     top of R hand, cut it on a broken window     Gauze drsg applied.  Last tetanus shot was 3 years ago.

## 2017-07-14 NOTE — DISCHARGE INSTRUCTIONS
Sutures will need to be removed in about 7 days.    Laceration Care, Adult  A laceration is a cut that goes through all of the layers of the skin and into the tissue that is right under the skin. Some lacerations heal on their own. Others need to be closed with stitches (sutures), staples, skin adhesive strips, or skin glue. Proper laceration care minimizes the risk of infection and helps the laceration to heal better.  HOW TO CARE FOR YOUR LACERATION  If sutures or staples were used:  · Keep the wound clean and dry.  · If you were given a bandage (dressing), you should change it at least one time per day or as told by your health care provider. You should also change it if it becomes wet or dirty.  · Keep the wound completely dry for the first 24 hours or as told by your health care provider. After that time, you may shower or bathe. However, make sure that the wound is not soaked in water until after the sutures or staples have been removed.  · Clean the wound one time each day or as told by your health care provider:  ¨ Wash the wound with soap and water.  ¨ Rinse the wound with water to remove all soap.  ¨ Pat the wound dry with a clean towel. Do not rub the wound.  · After cleaning the wound, apply a thin layer of antibiotic ointment as told by your health care provider. This will help to prevent infection and keep the dressing from sticking to the wound.  · Have the sutures or staples removed as told by your health care provider.  If skin adhesive strips were used:  · Keep the wound clean and dry.  · If you were given a bandage (dressing), you should change it at least one time per day or as told by your health care provider. You should also change it if it becomes dirty or wet.  · Do not get the skin adhesive strips wet. You may shower or bathe, but be careful to keep the wound dry.  · If the wound gets wet, pat it dry with a clean towel. Do not rub the wound.  · Skin adhesive strips fall off on their own.  You may trim the strips as the wound heals. Do not remove skin adhesive strips that are still stuck to the wound. They will fall off in time.  If skin glue was used:  · Try to keep the wound dry, but you may briefly wet it in the shower or bath. Do not soak the wound in water, such as by swimming.  · After you have showered or bathed, gently pat the wound dry with a clean towel. Do not rub the wound.  · Do not do any activities that will make you sweat heavily until the skin glue has fallen off on its own.  · Do not apply liquid, cream, or ointment medicine to the wound while the skin glue is in place. Using those may loosen the film before the wound has healed.  · If you were given a bandage (dressing), you should change it at least one time per day or as told by your health care provider. You should also change it if it becomes dirty or wet.  · If a dressing is placed over the wound, be careful not to apply tape directly over the skin glue. Doing that may cause the glue to be pulled off before the wound has healed.  · Do not pick at the glue. The skin glue usually remains in place for 5-10 days, then it falls off of the skin.  General Instructions  · Take over-the-counter and prescription medicines only as told by your health care provider.  · If you were prescribed an antibiotic medicine or ointment, take or apply it as told by your doctor. Do not stop using it even if your condition improves.  · To help prevent scarring, make sure to cover your wound with sunscreen whenever you are outside after stitches are removed, after adhesive strips are removed, or when glue remains in place and the wound is healed. Make sure to wear a sunscreen of at least 30 SPF.  · Do not scratch or pick at the wound.  · Keep all follow-up visits as told by your health care provider. This is important.  · Check your wound every day for signs of infection. Watch for:  ¨ Redness, swelling, or pain.  ¨ Fluid, blood, or pus.  · Raise  (elevate) the injured area above the level of your heart while you are sitting or lying down, if possible.  SEEK MEDICAL CARE IF:  · You received a tetanus shot and you have swelling, severe pain, redness, or bleeding at the injection site.  · You have a fever.  · A wound that was closed breaks open.  · You notice a bad smell coming from your wound or your dressing.  · You notice something coming out of the wound, such as wood or glass.  · Your pain is not controlled with medicine.  · You have increased redness, swelling, or pain at the site of your wound.  · You have fluid, blood, or pus coming from your wound.  · You notice a change in the color of your skin near your wound.  · You need to change the dressing frequently due to fluid, blood, or pus draining from the wound.  · You develop a new rash.  · You develop numbness around the wound.  SEEK IMMEDIATE MEDICAL CARE IF:  · You develop severe swelling around the wound.  · Your pain suddenly increases and is severe.  · You develop painful lumps near the wound or on skin that is anywhere on your body.  · You have a red streak going away from your wound.  · The wound is on your hand or foot and you cannot properly move a finger or toe.  · The wound is on your hand or foot and you notice that your fingers or toes look pale or bluish.     This information is not intended to replace advice given to you by your health care provider. Make sure you discuss any questions you have with your health care provider.     Document Released: 12/18/2006 Document Revised: 05/03/2016 Document Reviewed: 12/14/2015  [employer] Interactive Patient Education ©2016 [employer]

## 2017-07-14 NOTE — ED NOTES
Patient ambulated from ED lobby to Y55, gait steady.  Family at bedside.  Laceration to right hand noted, no active bleeding.

## 2017-07-20 ENCOUNTER — HOSPITAL ENCOUNTER (EMERGENCY)
Facility: MEDICAL CENTER | Age: 32
End: 2017-07-20
Attending: EMERGENCY MEDICINE
Payer: MEDICAID

## 2017-07-20 VITALS
OXYGEN SATURATION: 97 % | DIASTOLIC BLOOD PRESSURE: 76 MMHG | HEART RATE: 95 BPM | WEIGHT: 205.69 LBS | BODY MASS INDEX: 36.45 KG/M2 | TEMPERATURE: 97.7 F | RESPIRATION RATE: 16 BRPM | SYSTOLIC BLOOD PRESSURE: 128 MMHG

## 2017-07-20 DIAGNOSIS — L08.9 LACERATION OF RIGHT HAND WITH INFECTION, INITIAL ENCOUNTER: ICD-10-CM

## 2017-07-20 DIAGNOSIS — S61.411A LACERATION OF RIGHT HAND WITH INFECTION, INITIAL ENCOUNTER: ICD-10-CM

## 2017-07-20 PROCEDURE — 700102 HCHG RX REV CODE 250 W/ 637 OVERRIDE(OP): Performed by: EMERGENCY MEDICINE

## 2017-07-20 PROCEDURE — 99283 EMERGENCY DEPT VISIT LOW MDM: CPT

## 2017-07-20 PROCEDURE — A9270 NON-COVERED ITEM OR SERVICE: HCPCS | Performed by: EMERGENCY MEDICINE

## 2017-07-20 RX ORDER — CEPHALEXIN 500 MG/1
500 CAPSULE ORAL ONCE
Status: COMPLETED | OUTPATIENT
Start: 2017-07-21 | End: 2017-07-20

## 2017-07-20 RX ORDER — CEPHALEXIN 500 MG/1
500 CAPSULE ORAL 4 TIMES DAILY
Qty: 28 CAP | Refills: 0 | Status: SHIPPED | OUTPATIENT
Start: 2017-07-20 | End: 2017-07-27

## 2017-07-20 RX ADMIN — CEPHALEXIN 500 MG: 500 CAPSULE ORAL at 23:35

## 2017-07-20 ASSESSMENT — LIFESTYLE VARIABLES: DO YOU DRINK ALCOHOL: YES

## 2017-07-20 ASSESSMENT — PAIN SCALES - GENERAL: PAINLEVEL_OUTOF10: 8

## 2017-07-20 NOTE — ED AVS SNAPSHOT
7/20/2017    Misa Linda Lin  [address]  [place] [address]    Dear Misa:    [facility] wants to ensure your discharge home is safe and you or your loved ones have had all of your questions answered regarding your care after you leave the hospital.    Below is a list of resources and contact information should you have any questions regarding your hospital stay, follow-up instructions, or active medical symptoms.    Questions or Concerns Regarding… Contact   Medical Questions Related to Your Discharge  (7 days a week, 8am-5pm) Contact a Nurse Care Coordinator   [phone]   Medical Questions Not Related to Your Discharge  (24 hours a day / 7 days a week)  Contact the Nurse Health Line   [phone]    Medications or Discharge Instructions Refer to your discharge packet   or contact your [facility] Primary Care Provider   [phone]   Follow-up Appointment(s) Schedule your appointment via [employer]   or contact Scheduling [phone]   Billing Review your statement via [employer]  or contact Billing [phone]   Medical Records Review your records via [employer]   or contact Medical Records [phone]     You may receive a telephone call within two days of discharge. This call is to make certain you understand your discharge instructions and have the opportunity to have any questions answered. You can also easily access your medical information, test results and upcoming appointments via the [employer] free online health management tool. You can learn more and sign up at [employer]. For assistance setting up your [employer] account, please call [phone].    Once again, we want to ensure your discharge home is safe and that you have a clear understanding of any next steps in your care. If you have any questions or concerns, please do not hesitate to contact us, we are here for you. Thank you for choosing [facility] for your healthcare needs.    Sincerely,    Your [facility] Healthcare Team

## 2017-07-20 NOTE — ED AVS SNAPSHOT
Ciafo Access Code: [email or web address]  Expires: 8/10/2017  1:50 PM    Ciafo  A secure, online tool to manage your health information     [employer]’s Ciafo® is a secure, online tool that connects you to your personalized health information from the privacy of your home -- day or night - making it very easy for you to manage your healthcare. Once the activation process is completed, you can even access your medical information using the Ciafo mary, which is available for free in the [employer] Mary store or Google Play store.     Ciafo provides the following levels of access (as shown below):   My Chart Features   [facility] Primary Care Doctor [facility]  Specialists [facility]  Urgent  Care Non-[facility]  Primary Care  Doctor   Email your healthcare team securely and privately 24/7 X X X X   Manage appointments: schedule your next appointment; view details of past/upcoming appointments X      Request prescription refills. X      View recent personal medical records, including lab and immunizations X X X X   View health record, including health history, allergies, medications X X X X   Read reports about your outpatient visits, procedures, consult and ER notes X X X X   See your discharge summary, which is a recap of your hospital and/or ER visit that includes your diagnosis, lab results, and care plan. X X       How to register for Ciafo:  1. Go to  [email or web address].  2. Click on the Sign Up Now box, which takes you to the New Member Sign Up page. You will need to provide the following information:  a. Enter your Ciafo Access Code exactly as it appears at the top of this page. (You will not need to use this code after you’ve completed the sign-up process. If you do not sign up before the expiration date, you must request a new code.)   b. Enter your date of birth.   c. Enter your home email address.   d. Click Submit, and follow the next screen’s instructions.  3. Create a Ciafo ID. This will be your Ciafo  login ID and cannot be changed, so think of one that is secure and easy to remember.  4. Create a [employer] password. You can change your password at any time.  5. Enter your Password Reset Question and Answer. This can be used at a later time if you forget your password.   6. Enter your e-mail address. This allows you to receive e-mail notifications when new information is available in [employer].  7. Click Sign Up. You can now view your health information.    For assistance activating your [employer] account, call [phone]

## 2017-07-20 NOTE — ED AVS SNAPSHOT
Home Care Instructions                                                                                                                Misa Lin   MRN: [id number]    Department:  [facility], Emergency Dept   Date of Visit:  7/20/2017            [facility], Emergency Dept    [address]    [place] [address]    Phone:  [phone]      You were seen by     Catrachito Hitchcock M.D.      Your Diagnosis Was     Laceration of right hand with infection, initial encounter     S61.411A, L08.9       Follow-up Information     1. Schedule an appointment as soon as possible for a visit with [facility].    Specialty:  Family Medicine    Contact information    [address]  [address]  [place] [address]  [phone]        Medication Information     Review all of your home medications and newly ordered medications with your primary doctor and/or pharmacist as soon as possible. Follow medication instructions as directed by your doctor and/or pharmacist.     Please keep your complete medication list with you and share with your physician. Update the information when medications are discontinued, doses are changed, or new medications (including over-the-counter products) are added; and carry medication information at all times in the event of emergency situations.               Medication List      START taking these medications        Instructions    Morning Afternoon Evening Bedtime    cephALEXin 500 MG Caps   Commonly known as:  KEFLEX        Take 1 Cap by mouth 4 times a day for 7 days.   Dose:  500 mg                             Where to Get Your Medications      These medications were sent to [facility] #[id number] - [place] - [address]  [address] [place] [address]     Phone:  [phone]    - cephALEXin 500 MG Caps              Discharge Instructions       Laceration Care, Adult  A laceration is a cut that goes through all layers of the skin. The cut  also goes into the tissue that is right under the skin. Some cuts heal on their own. Others need to be closed with stitches (sutures), staples, skin adhesive strips, or wound glue. Taking care of your cut lowers your risk of infection and helps your cut to heal better.  HOW TO TAKE CARE OF YOUR CUT  For stitches or staples:  · Keep the wound clean and dry.  · If you were given a bandage (dressing), you should change it at least one time per day or as told by your doctor. You should also change it if it gets wet or dirty.  · Keep the wound completely dry for the first 24 hours or as told by your doctor. After that time, you may take a shower or a bath. However, make sure that the wound is not soaked in water until after the stitches or staples have been removed.  · Clean the wound one time each day or as told by your doctor:  ¨ Wash the wound with soap and water.  ¨ Rinse the wound with water until all of the soap comes off.  ¨ Pat the wound dry with a clean towel. Do not rub the wound.  · After you clean the wound, put a thin layer of antibiotic ointment on it as told by your doctor. This ointment:  ¨ Helps to prevent infection.  ¨ Keeps the bandage from sticking to the wound.  · Have your stitches or staples removed as told by your doctor.  If your doctor used skin adhesive strips:   · Keep the wound clean and dry.  · If you were given a bandage, you should change it at least one time per day or as told by your doctor. You should also change it if it gets dirty or wet.  · Do not get the skin adhesive strips wet. You can take a shower or a bath, but be careful to keep the wound dry.  · If the wound gets wet, pat it dry with a clean towel. Do not rub the wound.  · Skin adhesive strips fall off on their own. You can trim the strips as the wound heals. Do not remove any strips that are still stuck to the wound. They will fall off after a while.  If your doctor used wound glue:  · Try to keep your wound dry, but you may  briefly wet it in the shower or bath. Do not soak the wound in water, such as by swimming.  · After you take a shower or a bath, gently pat the wound dry with a clean towel. Do not rub the wound.  · Do not do any activities that will make you really sweaty until the skin glue has fallen off on its own.  · Do not apply liquid, cream, or ointment medicine to your wound while the skin glue is still on.  · If you were given a bandage, you should change it at least one time per day or as told by your doctor. You should also change it if it gets dirty or wet.  · If a bandage is placed over the wound, do not let the tape for the bandage touch the skin glue.  · Do not pick at the glue. The skin glue usually stays on for 5-10 days. Then, it falls off of the skin.  General Instructions   · To help prevent scarring, make sure to cover your wound with sunscreen whenever you are outside after stitches are removed, after adhesive strips are removed, or when wound glue stays in place and the wound is healed. Make sure to wear a sunscreen of at least 30 SPF.  · Take over-the-counter and prescription medicines only as told by your doctor.  · If you were given antibiotic medicine or ointment, take or apply it as told by your doctor. Do not stop using the antibiotic even if your wound is getting better.  · Do not scratch or pick at the wound.  · Keep all follow-up visits as told by your doctor. This is important.  · Check your wound every day for signs of infection. Watch for:  ¨ Redness, swelling, or pain.  ¨ Fluid, blood, or pus.  · Raise (elevate) the injured area above the level of your heart while you are sitting or lying down, if possible.  GET HELP IF:  · You got a tetanus shot and you have any of these problems at the injection site:  ¨ Swelling.  ¨ Very bad pain.  ¨ Redness.  ¨ Bleeding.  · You have a fever.  · A wound that was closed breaks open.  · You notice a bad smell coming from your wound or your bandage.  · You notice  something coming out of the wound, such as wood or glass.  · Medicine does not help your pain.  · You have more redness, swelling, or pain at the site of your wound.  · You have fluid, blood, or pus coming from your wound.  · You notice a change in the color of your skin near your wound.  · You need to change the bandage often because fluid, blood, or pus is coming from the wound.  · You start to have a new rash.  · You start to have numbness around the wound.  GET HELP RIGHT AWAY IF:  · You have very bad swelling around the wound.  · Your pain suddenly gets worse and is very bad.  · You notice painful lumps near the wound or on skin that is anywhere on your body.  · You have a red streak going away from your wound.  · The wound is on your hand or foot and you cannot move a finger or toe like you usually can.  · The wound is on your hand or foot and you notice that your fingers or toes look pale or bluish.     This information is not intended to replace advice given to you by your health care provider. Make sure you discuss any questions you have with your health care provider.     Document Released: 06/05/2009 Document Revised: 05/03/2016 Document Reviewed: 12/14/2015  [employer] Interactive Patient Education ©2016 [employer]            Patient Information     Patient Information    Following emergency treatment: all patient requiring follow-up care must return either to a private physician or a clinic if your condition worsens before you are able to obtain further medical attention, please return to the emergency room.     Billing Information    At [facility], we work to make the billing process streamlined for our patients.  Our Representatives are here to answer any questions you may have regarding your hospital bill.  If you have insurance coverage and have supplied your insurance information to us, we will submit a claim to your insurer on your behalf.  Should you have any questions regarding your bill, we  can be reached online or by phone as follows:  Online: You are able pay your bills online or live chat with our representatives about any billing questions you may have. We are here to help Monday - Friday from 8:00am to 7:30pm and 9:00am - 12:00pm on Saturdays.  Please visit [email or web address]  for more information.   Phone:  [phone] or [phone]    Please note that your emergency physician, surgeon, pathologist, radiologist, anesthesiologist, and other specialists are not employed by [facility] and will therefore bill separately for their services.  Please contact them directly for any questions concerning their bills at the numbers below:     Emergency Physician Services:  [phone]  [facility]:  [phone]  [facility]:  [phone]  [facility]:  [phone]    1. Your final bill may vary from the amount quoted upon discharge if all procedures are not complete at that time, or if your doctor has additional procedures of which we are not aware. You will receive an additional bill if you return to the Emergency Department at [facility] for suture removal regardless of the facility of which the sutures were placed.     2. Please arrange for settlement of this account at the emergency registration.    3. All self-pay accounts are due in full at the time of treatment.  If you are unable to meet this obligation then payment is expected within 4-5 days.     4. If you have had radiology studies (CT, X-ray, Ultrasound, MRI), you have received a preliminary result during your emergency department visit. Please contact the radiology department [phone] to receive a copy of your final result. Please discuss the Final result with your primary physician or with the follow up physician provided.     Crisis Hotline:  [place] Crisis Hotline:  1-153-VZYTHIH or 1-804.523.4251  [place] Crisis Hotline:    1-423.365.3632 or  925.604.2195         ED Discharge Follow Up Questions    1. In order to provide you with very good care, we would like to follow up with a phone call in the next few days.  May we have your permission to contact you?     YES /  NO    2. What is the best phone number to call you? (       )_____-__________    3. What is the best time to call you?      Morning  /  Afternoon  /  Evening                   Patient Signature:  ____________________________________________________________    Date:  ____________________________________________________________

## 2017-07-21 NOTE — ED PROVIDER NOTES
ED Provider Note    CHIEF COMPLAINT  Chief Complaint   Patient presents with   • Wound Check     right hand        HPI  Iris Linda Lin is a 31 y.o. female who presents here for evaluation of a right hand laceration wound which was repaired. The patient had the wound repaired 6 days ago. Patient states that the wound over the past 2 days has become more swollen slightly erythematous and painful. Due to this increase in these symptoms, the patient was concerned and decided to present here for evaluation. Notably the patient denies any associated fevers or severe pain in the hand or fingers.    REVIEW OF SYSTEMS  See HPI for further details. All other systems are negative.     PAST MEDICAL HISTORY   has a past medical history of Anemia (2006,2009); GERD (gastroesophageal reflux disease); and Blood transfusion (2006).    SOCIAL HISTORY  Social History     Social History Main Topics   • Smoking status: Never Smoker    • Smokeless tobacco: Never Used   • Alcohol Use: No   • Drug Use: Yes     Special: Inhaled      Comment: marijuana   • Sexual Activity:     Partners: Male      Comment: none        SURGICAL HISTORY   has past surgical history that includes other; abdominal exploration; primary c section; repeat c section (6/11/2014); and umbilical hernia repair (3/10/2015).    CURRENT MEDICATIONS  Home Medications     **Home medications have not yet been reviewed for this encounter**          ALLERGIES  Allergies   Allergen Reactions   • Shrimp Flavor Anaphylaxis       PHYSICAL EXAM  VITAL SIGNS: /73 mmHg  Pulse 96  Temp(Src) 36.5 °C (97.7 °F)  Resp 18  Wt 93.3 kg (205 lb 11 oz)  SpO2 97%  LMP 07/10/2017   Pulse ox interpretation: I interpret this pulse ox as normal.  Constitutional: Alert in no apparent distress.  HENT: Normocephalic, Atraumatic, Bilateral external ears normal. Nose normal.   Eyes: Pupils are equal and reactive. Conjunctiva normal, non-icteric.   Heart: Regular rate and rythm.  Lungs:  No audible wheezing, no increased work of breathing, no accessory muscle use.  Abdomen: Soft, non-distended, non-tender   Skin: Warm, Dry, No erythema, No rash.   Extremities: Right hand with a 3 cm laceration over the dorsal aspect of the hand with slight swelling and erythema around the sutured laceration  Neurologic: Alert, Grossly non-focal.   Psychiatric: Affect normal, Judgment normal, Mood normal, appears alert and not intoxicated.     Suture/ Staple Removal Procedure Note    Indication: infection present    Procedure: The patient was placed in the appropriate position and the sutures were removed without difficulty.    Other items: the wound appears infected    The patient tolerated the procedure well.    Complications: wound dehiscence      COURSE & MEDICAL DECISION MAKING  Pertinent Labs & Imaging studies reviewed. (See chart for details)    Patient presented here for evaluation of hand pain swelling and erythema. Here on examination the wound appears tightly infected. There is just scant amount of purulent discharge coming from the wound after the sutures were removed. The patient has some granulation tissue at the base of the wound however, and some surrounding erythema and swelling. Given this I think the patient likely has a small amount of wound dehiscence from the surrounding cellulitis. Given this, the patient had the wound closed just using Steri-Strips. She will be given a 1st dose of Keflex here, and discharged with a prescription for Keflex to take for the next 7 days. The patient was instructed to return here should she develop increasing erythema, swelling, or tenderness suggestive of worsening infection.    The patient will not drink alcohol nor drive with prescribed medications. The patient will return for worsening symptoms and is stable at the time of discharge. The patient verbalizes understanding and will comply.    The patient is referred to a primary physician for blood pressure  management, diabetic screening, and for all other preventative health concerns, should they be present.    FINAL IMPRESSION  1. Right hand wound infection, initial encounter  2. Wound dehiscence, right hand  3.         Electronically signed by: Catrachito Hitchcock, 7/20/2017 11:30 PM      This record was made with a voice recognition software. I have tried to correct any grammar, spelling or context errors to the best of my ability, but errors may still remain. Interpretation of this chart should be taken in this context.

## 2017-07-21 NOTE — ED NOTES
Misa Lin  31 y.o.  female  Chief Complaint   Patient presents with   • Wound Check     right hand      Present to triage c/o redness and swelling on suture site. Suture placed last Friday. No drainage noted at this time. But patient stated that there was a pus coming out earlier today.

## 2017-07-21 NOTE — DISCHARGE INSTRUCTIONS
Laceration Care, Adult  A laceration is a cut that goes through all layers of the skin. The cut also goes into the tissue that is right under the skin. Some cuts heal on their own. Others need to be closed with stitches (sutures), staples, skin adhesive strips, or wound glue. Taking care of your cut lowers your risk of infection and helps your cut to heal better.  HOW TO TAKE CARE OF YOUR CUT  For stitches or staples:  · Keep the wound clean and dry.  · If you were given a bandage (dressing), you should change it at least one time per day or as told by your doctor. You should also change it if it gets wet or dirty.  · Keep the wound completely dry for the first 24 hours or as told by your doctor. After that time, you may take a shower or a bath. However, make sure that the wound is not soaked in water until after the stitches or staples have been removed.  · Clean the wound one time each day or as told by your doctor:  ¨ Wash the wound with soap and water.  ¨ Rinse the wound with water until all of the soap comes off.  ¨ Pat the wound dry with a clean towel. Do not rub the wound.  · After you clean the wound, put a thin layer of antibiotic ointment on it as told by your doctor. This ointment:  ¨ Helps to prevent infection.  ¨ Keeps the bandage from sticking to the wound.  · Have your stitches or staples removed as told by your doctor.  If your doctor used skin adhesive strips:   · Keep the wound clean and dry.  · If you were given a bandage, you should change it at least one time per day or as told by your doctor. You should also change it if it gets dirty or wet.  · Do not get the skin adhesive strips wet. You can take a shower or a bath, but be careful to keep the wound dry.  · If the wound gets wet, pat it dry with a clean towel. Do not rub the wound.  · Skin adhesive strips fall off on their own. You can trim the strips as the wound heals. Do not remove any strips that are still stuck to the wound. They will  fall off after a while.  If your doctor used wound glue:  · Try to keep your wound dry, but you may briefly wet it in the shower or bath. Do not soak the wound in water, such as by swimming.  · After you take a shower or a bath, gently pat the wound dry with a clean towel. Do not rub the wound.  · Do not do any activities that will make you really sweaty until the skin glue has fallen off on its own.  · Do not apply liquid, cream, or ointment medicine to your wound while the skin glue is still on.  · If you were given a bandage, you should change it at least one time per day or as told by your doctor. You should also change it if it gets dirty or wet.  · If a bandage is placed over the wound, do not let the tape for the bandage touch the skin glue.  · Do not pick at the glue. The skin glue usually stays on for 5-10 days. Then, it falls off of the skin.  General Instructions   · To help prevent scarring, make sure to cover your wound with sunscreen whenever you are outside after stitches are removed, after adhesive strips are removed, or when wound glue stays in place and the wound is healed. Make sure to wear a sunscreen of at least 30 SPF.  · Take over-the-counter and prescription medicines only as told by your doctor.  · If you were given antibiotic medicine or ointment, take or apply it as told by your doctor. Do not stop using the antibiotic even if your wound is getting better.  · Do not scratch or pick at the wound.  · Keep all follow-up visits as told by your doctor. This is important.  · Check your wound every day for signs of infection. Watch for:  ¨ Redness, swelling, or pain.  ¨ Fluid, blood, or pus.  · Raise (elevate) the injured area above the level of your heart while you are sitting or lying down, if possible.  GET HELP IF:  · You got a tetanus shot and you have any of these problems at the injection site:  ¨ Swelling.  ¨ Very bad pain.  ¨ Redness.  ¨ Bleeding.  · You have a fever.  · A wound that was  closed breaks open.  · You notice a bad smell coming from your wound or your bandage.  · You notice something coming out of the wound, such as wood or glass.  · Medicine does not help your pain.  · You have more redness, swelling, or pain at the site of your wound.  · You have fluid, blood, or pus coming from your wound.  · You notice a change in the color of your skin near your wound.  · You need to change the bandage often because fluid, blood, or pus is coming from the wound.  · You start to have a new rash.  · You start to have numbness around the wound.  GET HELP RIGHT AWAY IF:  · You have very bad swelling around the wound.  · Your pain suddenly gets worse and is very bad.  · You notice painful lumps near the wound or on skin that is anywhere on your body.  · You have a red streak going away from your wound.  · The wound is on your hand or foot and you cannot move a finger or toe like you usually can.  · The wound is on your hand or foot and you notice that your fingers or toes look pale or bluish.     This information is not intended to replace advice given to you by your health care provider. Make sure you discuss any questions you have with your health care provider.     Document Released: 06/05/2009 Document Revised: 05/03/2016 Document Reviewed: 12/14/2015  [employer] Interactive Patient Education ©2016 [employer]

## 2017-12-24 VITALS
TEMPERATURE: 96.9 F | SYSTOLIC BLOOD PRESSURE: 125 MMHG | WEIGHT: 195.55 LBS | OXYGEN SATURATION: 98 % | RESPIRATION RATE: 14 BRPM | HEIGHT: 63 IN | DIASTOLIC BLOOD PRESSURE: 86 MMHG | BODY MASS INDEX: 34.65 KG/M2 | HEART RATE: 76 BPM

## 2017-12-24 PROCEDURE — 302449 STATCHG TRIAGE ONLY (STATISTIC)

## 2017-12-25 ENCOUNTER — HOSPITAL ENCOUNTER (EMERGENCY)
Facility: MEDICAL CENTER | Age: 32
End: 2017-12-25
Payer: MEDICAID

## 2017-12-25 NOTE — ED NOTES
"Chief Complaint   Patient presents with   • Numbness     \"Both my hands got numb 6 hours ago and now they both hurt, I think it's because I ate pork.\" Cap refill less than 3 sec. Radial pulses WDL. Moving both hands with equal  strength.     • Hand Pain     Ambulatory to triage with above cc. NAD.    /86   Pulse 76   Temp 36.1 °C (96.9 °F) (Temporal)   Resp 14   Ht 1.6 m (5' 3\")   Wt 88.7 kg (195 lb 8.8 oz)   LMP 10/05/2013   SpO2 98%   BMI 34.64 kg/m²     Pt back to [facility], informed to notify staff of any changes.     "

## 2018-09-29 ENCOUNTER — HOSPITAL ENCOUNTER (EMERGENCY)
Facility: MEDICAL CENTER | Age: 33
End: 2018-09-29
Attending: EMERGENCY MEDICINE
Payer: MEDICAID

## 2018-09-29 VITALS
HEIGHT: 63 IN | RESPIRATION RATE: 18 BRPM | WEIGHT: 176.81 LBS | BODY MASS INDEX: 31.33 KG/M2 | TEMPERATURE: 97.2 F | HEART RATE: 104 BPM | OXYGEN SATURATION: 97 % | DIASTOLIC BLOOD PRESSURE: 86 MMHG | SYSTOLIC BLOOD PRESSURE: 115 MMHG

## 2018-09-29 DIAGNOSIS — R10.84 GENERALIZED ABDOMINAL PAIN: ICD-10-CM

## 2018-09-29 DIAGNOSIS — Z3A.17 17 WEEKS GESTATION OF PREGNANCY: ICD-10-CM

## 2018-09-29 PROCEDURE — 99284 EMERGENCY DEPT VISIT MOD MDM: CPT

## 2018-09-29 ASSESSMENT — PAIN SCALES - GENERAL: PAINLEVEL_OUTOF10: 0
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
## 2018-10-01 ENCOUNTER — APPOINTMENT (OUTPATIENT)
Dept: RADIOLOGY | Facility: MEDICAL CENTER | Age: 33
End: 2018-10-01
Attending: EMERGENCY MEDICINE
Payer: MEDICAID

## 2018-10-01 ENCOUNTER — HOSPITAL ENCOUNTER (EMERGENCY)
Facility: MEDICAL CENTER | Age: 33
End: 2018-10-01
Attending: EMERGENCY MEDICINE
Payer: MEDICAID

## 2018-10-01 VITALS
SYSTOLIC BLOOD PRESSURE: 123 MMHG | HEIGHT: 63 IN | RESPIRATION RATE: 16 BRPM | TEMPERATURE: 97.9 F | DIASTOLIC BLOOD PRESSURE: 74 MMHG | BODY MASS INDEX: 31.25 KG/M2 | OXYGEN SATURATION: 97 % | HEART RATE: 87 BPM | WEIGHT: 176.37 LBS

## 2018-10-01 DIAGNOSIS — S39.011A ABDOMINAL WALL STRAIN, INITIAL ENCOUNTER: ICD-10-CM

## 2018-10-01 DIAGNOSIS — Z34.92 SECOND TRIMESTER PREGNANCY: ICD-10-CM

## 2018-10-01 DIAGNOSIS — R82.71 BACTERIA IN URINE: ICD-10-CM

## 2018-10-01 LAB
APPEARANCE UR: CLEAR
BACTERIA #/AREA URNS HPF: ABNORMAL /HPF
BILIRUB UR QL STRIP.AUTO: NEGATIVE
COLOR UR: YELLOW
EPI CELLS #/AREA URNS HPF: ABNORMAL /HPF
GLUCOSE UR STRIP.AUTO-MCNC: NEGATIVE MG/DL
HYALINE CASTS #/AREA URNS LPF: ABNORMAL /LPF
KETONES UR STRIP.AUTO-MCNC: NEGATIVE MG/DL
LEUKOCYTE ESTERASE UR QL STRIP.AUTO: NEGATIVE
MICRO URNS: ABNORMAL
NITRITE UR QL STRIP.AUTO: POSITIVE
PH UR STRIP.AUTO: 5 [PH]
PROT UR QL STRIP: NEGATIVE MG/DL
RBC # URNS HPF: ABNORMAL /HPF
RBC UR QL AUTO: NEGATIVE
SP GR UR STRIP.AUTO: 1.02
UROBILINOGEN UR STRIP.AUTO-MCNC: 0.2 MG/DL
WBC #/AREA URNS HPF: ABNORMAL /HPF

## 2018-10-01 PROCEDURE — 302875 HCHG BANDAGE ACE 4 OR 6""

## 2018-10-01 PROCEDURE — 76815 OB US LIMITED FETUS(S): CPT

## 2018-10-01 PROCEDURE — A9270 NON-COVERED ITEM OR SERVICE: HCPCS | Performed by: EMERGENCY MEDICINE

## 2018-10-01 PROCEDURE — 99284 EMERGENCY DEPT VISIT MOD MDM: CPT

## 2018-10-01 PROCEDURE — 700102 HCHG RX REV CODE 250 W/ 637 OVERRIDE(OP): Performed by: EMERGENCY MEDICINE

## 2018-10-01 PROCEDURE — 81001 URINALYSIS AUTO W/SCOPE: CPT

## 2018-10-01 RX ORDER — NITROFURANTOIN 25; 75 MG/1; MG/1
100 CAPSULE ORAL 2 TIMES DAILY
Qty: 10 CAP | Refills: 0 | Status: SHIPPED | OUTPATIENT
Start: 2018-10-01 | End: 2018-10-06

## 2018-10-01 RX ORDER — ACETAMINOPHEN 325 MG/1
650 TABLET ORAL ONCE
Status: COMPLETED | OUTPATIENT
Start: 2018-10-01 | End: 2018-10-01

## 2018-10-01 RX ADMIN — ACETAMINOPHEN 650 MG: 325 TABLET, FILM COATED ORAL at 02:19

## 2018-10-01 ASSESSMENT — PAIN SCALES - GENERAL
PAINLEVEL_OUTOF10: 7
PAINLEVEL_OUTOF10: 8

## 2018-10-01 ASSESSMENT — LIFESTYLE VARIABLES: DO YOU DRINK ALCOHOL: NO

## 2018-10-01 ASSESSMENT — PAIN DESCRIPTION - DESCRIPTORS: DESCRIPTORS: SHARP

## 2018-10-01 NOTE — ED TRIAGE NOTES
Pt reports lower abdominal pain that started tonight while she was a work lifting heavy objects,  Pt denies N/V/D pt denies fever chills, pt denies urinary symptoms,pt was sleeping in waiting room, she ambulated to er 24 with strong steady gaie

## 2018-10-01 NOTE — ED TRIAGE NOTES
"Chief Complaint   Patient presents with   • Abdominal Pain     Pt reporting \"sharp\" constant pain in umbilical region s/p pulling heavy object at work. Pt is 17 weeks pregnant, denies cramping or vaginal bleeding. . + prenatal.     /83   Pulse 98   Temp 36.6 °C (97.9 °F)   Resp 16   Ht 1.6 m (5' 3\")   Wt 80 kg (176 lb 5.9 oz)   SpO2 96%   BMI 31.24 kg/m²     Pt ambulated into triage, complaints as above. VS as above, NAD, encouraged to return to the triage nurse or tech with any new complaints or symptoms. Urine collection cup in a bag, given to patient as well as instructions on their use, pt verbalized understanding.  "
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
## 2018-10-01 NOTE — LETTER
Emergency Services     October 1, 2018    Patient: Misa Lin   YOB: 1985   Date of Visit: 10/1/2018       To Whom It May Concern:    Misa Lin was seen and treated in our emergency department on 10/1/2018. She may return to work Tuesday night, please excuse her shift Monday night 10/1/2018  .    Sincerely,     JUSTICE CANTU M.D.  [facility], EMERGENCY DEPT  Dept: [phone]

## 2018-10-01 NOTE — DISCHARGE INSTRUCTIONS
It seems as if you have strained your abdominal muscle wall.  Please use ice packs and Tylenol at home he was given an Ace wrap which she can wrap around for the next 2 days for additional support.  Please return to the emergency department with any new or worsening symptoms including right lower quadrant pain fevers and vomiting at this time there is no evidence that you have an intra-abdominal infection including appendicitis.  He did have bacteria in your urine in the setting of pregnancy will be sent home with antibiotics

## 2018-12-16 ENCOUNTER — HOSPITAL ENCOUNTER (INPATIENT)
Facility: MEDICAL CENTER | Age: 33
LOS: 3 days | End: 2018-12-19
Attending: OBSTETRICS & GYNECOLOGY | Admitting: OBSTETRICS & GYNECOLOGY
Payer: MEDICAID

## 2018-12-16 ENCOUNTER — HOSPITAL ENCOUNTER (OUTPATIENT)
Facility: MEDICAL CENTER | Age: 33
End: 2018-12-16
Attending: OBSTETRICS & GYNECOLOGY | Admitting: OBSTETRICS & GYNECOLOGY
Payer: MEDICAID

## 2018-12-16 ENCOUNTER — HOSPITAL ENCOUNTER (EMERGENCY)
Facility: MEDICAL CENTER | Age: 33
End: 2018-12-16
Payer: MEDICAID

## 2018-12-16 VITALS
HEIGHT: 63 IN | HEART RATE: 86 BPM | BODY MASS INDEX: 31.18 KG/M2 | SYSTOLIC BLOOD PRESSURE: 115 MMHG | DIASTOLIC BLOOD PRESSURE: 72 MMHG | WEIGHT: 176 LBS

## 2018-12-16 DIAGNOSIS — G89.18 ACUTE POST-OPERATIVE PAIN: ICD-10-CM

## 2018-12-16 DIAGNOSIS — O36.4XX0: ICD-10-CM

## 2018-12-16 LAB
ABO GROUP BLD: NORMAL
ADULT RBCS COUNTED 8505ARB2: 4950 ADULT RBC
ALBUMIN SERPL BCP-MCNC: 3.7 G/DL (ref 3.2–4.9)
ALBUMIN/GLOB SERPL: 1.2 G/DL
ALP SERPL-CCNC: 62 U/L (ref 30–99)
ALT SERPL-CCNC: 9 U/L (ref 2–50)
AMPHET UR QL SCN: NEGATIVE
ANION GAP SERPL CALC-SCNC: 7 MMOL/L (ref 0–11.9)
APTT PPP: 26.6 SEC (ref 24.7–36)
APTT PPP: 26.9 SEC (ref 24.7–36)
AST SERPL-CCNC: 12 U/L (ref 12–45)
BARBITURATES UR QL SCN: NEGATIVE
BASOPHILS # BLD AUTO: 0.6 % (ref 0–1.8)
BASOPHILS # BLD AUTO: 0.6 % (ref 0–1.8)
BASOPHILS # BLD: 0.05 K/UL (ref 0–0.12)
BASOPHILS # BLD: 0.05 K/UL (ref 0–0.12)
BENZODIAZ UR QL SCN: NEGATIVE
BILIRUB SERPL-MCNC: 0.6 MG/DL (ref 0.1–1.5)
BLD GP AB SCN SERPL QL: NORMAL
BUN SERPL-MCNC: 6 MG/DL (ref 8–22)
BZE UR QL SCN: NEGATIVE
CALCIUM SERPL-MCNC: 9.1 MG/DL (ref 8.5–10.5)
CANNABINOIDS UR QL SCN: POSITIVE
CHLORIDE SERPL-SCNC: 104 MMOL/L (ref 96–112)
CO2 SERPL-SCNC: 25 MMOL/L (ref 20–33)
CREAT SERPL-MCNC: 0.4 MG/DL (ref 0.5–1.4)
EOSINOPHIL # BLD AUTO: 0.15 K/UL (ref 0–0.51)
EOSINOPHIL # BLD AUTO: 0.19 K/UL (ref 0–0.51)
EOSINOPHIL NFR BLD: 1.9 % (ref 0–6.9)
EOSINOPHIL NFR BLD: 2.2 % (ref 0–6.9)
ERYTHROCYTE [DISTWIDTH] IN BLOOD BY AUTOMATED COUNT: 46.2 FL (ref 35.9–50)
ERYTHROCYTE [DISTWIDTH] IN BLOOD BY AUTOMATED COUNT: 46.8 FL (ref 35.9–50)
FETAL RBC PERCENT 8505FRBP: 0.02 %
FETAL STAIN FRBC 8505FRBC: 1 FETAL RBC
FIBRINOGEN PPP-MCNC: 444 MG/DL (ref 215–460)
GLOBULIN SER CALC-MCNC: 3.1 G/DL (ref 1.9–3.5)
GLUCOSE SERPL-MCNC: 82 MG/DL (ref 65–99)
HBV SURFACE AG SER QL: NEGATIVE
HCT VFR BLD AUTO: 33 % (ref 37–47)
HCT VFR BLD AUTO: 36.1 % (ref 37–47)
HGB BLD-MCNC: 11 G/DL (ref 12–16)
HGB BLD-MCNC: 11.8 G/DL (ref 12–16)
HIV 1+2 AB+HIV1 P24 AG SERPL QL IA: NON REACTIVE
HOLDING TUBE BB 8507: NORMAL
IMM GRANULOCYTES # BLD AUTO: 0.05 K/UL (ref 0–0.11)
IMM GRANULOCYTES # BLD AUTO: 0.06 K/UL (ref 0–0.11)
IMM GRANULOCYTES NFR BLD AUTO: 0.6 % (ref 0–0.9)
IMM GRANULOCYTES NFR BLD AUTO: 0.7 % (ref 0–0.9)
INR PPP: 0.94 (ref 0.87–1.13)
INR PPP: 0.97 (ref 0.87–1.13)
LA PPP-IMP: NORMAL
LYMPHOCYTES # BLD AUTO: 2.81 K/UL (ref 1–4.8)
LYMPHOCYTES # BLD AUTO: 3.04 K/UL (ref 1–4.8)
LYMPHOCYTES NFR BLD: 34.9 % (ref 22–41)
LYMPHOCYTES NFR BLD: 35.2 % (ref 22–41)
MCH RBC QN AUTO: 29.3 PG (ref 27–33)
MCH RBC QN AUTO: 29.3 PG (ref 27–33)
MCHC RBC AUTO-ENTMCNC: 32.7 G/DL (ref 33.6–35)
MCHC RBC AUTO-ENTMCNC: 33.3 G/DL (ref 33.6–35)
MCV RBC AUTO: 87.8 FL (ref 81.4–97.8)
MCV RBC AUTO: 89.6 FL (ref 81.4–97.8)
METHADONE UR QL SCN: NEGATIVE
MONOCYTES # BLD AUTO: 0.46 K/UL (ref 0–0.85)
MONOCYTES # BLD AUTO: 0.51 K/UL (ref 0–0.85)
MONOCYTES NFR BLD AUTO: 5.3 % (ref 0–13.4)
MONOCYTES NFR BLD AUTO: 6.3 % (ref 0–13.4)
NEUTROPHILS # BLD AUTO: 4.49 K/UL (ref 2–7.15)
NEUTROPHILS # BLD AUTO: 4.84 K/UL (ref 2–7.15)
NEUTROPHILS NFR BLD: 55.7 % (ref 44–72)
NEUTROPHILS NFR BLD: 56 % (ref 44–72)
NRBC # BLD AUTO: 0 K/UL
NRBC # BLD AUTO: 0 K/UL
NRBC BLD-RTO: 0 /100 WBC
NRBC BLD-RTO: 0 /100 WBC
NUMBER OF RH DOSES IND 8505RD: NORMAL
NUMBER OF RH DOSES IND 8505RD: NORMAL # RHIG
OPIATES UR QL SCN: NEGATIVE
OXYCODONE UR QL SCN: NEGATIVE
PCP UR QL SCN: NEGATIVE
PLATELET # BLD AUTO: 169 K/UL (ref 164–446)
PLATELET # BLD AUTO: 282 K/UL (ref 164–446)
PMV BLD AUTO: 10.5 FL (ref 9–12.9)
PMV BLD AUTO: 12.2 FL (ref 9–12.9)
POTASSIUM SERPL-SCNC: 3.4 MMOL/L (ref 3.6–5.5)
PROPOXYPH UR QL SCN: NEGATIVE
PROT SERPL-MCNC: 6.8 G/DL (ref 6–8.2)
PROTHROMBIN TIME: 12.7 SEC (ref 12–14.6)
PROTHROMBIN TIME: 13 SEC (ref 12–14.6)
RBC # BLD AUTO: 3.76 M/UL (ref 4.2–5.4)
RBC # BLD AUTO: 4.03 M/UL (ref 4.2–5.4)
RH BLD: NORMAL
RUBV AB SER QL: 17.1 IU/ML
SCREEN DRVVT: 35.4 SEC (ref 28–48)
SODIUM SERPL-SCNC: 136 MMOL/L (ref 135–145)
TREPONEMA PALLIDUM IGG+IGM AB [PRESENCE] IN SERUM OR PLASMA BY IMMUNOASSAY: NON REACTIVE
TSH SERPL DL<=0.005 MIU/L-ACNC: 1.02 UIU/ML (ref 0.38–5.33)
UFH PPP CHRO-ACNC: <0.1 U/ML
WBC # BLD AUTO: 8.1 K/UL (ref 4.8–10.8)
WBC # BLD AUTO: 8.6 K/UL (ref 4.8–10.8)
WEAK D AG RBC QL: NORMAL

## 2018-12-16 PROCEDURE — 700102 HCHG RX REV CODE 250 W/ 637 OVERRIDE(OP): Performed by: ANESTHESIOLOGY

## 2018-12-16 PROCEDURE — 87340 HEPATITIS B SURFACE AG IA: CPT

## 2018-12-16 PROCEDURE — 86762 RUBELLA ANTIBODY: CPT

## 2018-12-16 PROCEDURE — 87389 HIV-1 AG W/HIV-1&-2 AB AG IA: CPT

## 2018-12-16 PROCEDURE — 86901 BLOOD TYPING SEROLOGIC RH(D): CPT

## 2018-12-16 PROCEDURE — 700111 HCHG RX REV CODE 636 W/ 250 OVERRIDE (IP)

## 2018-12-16 PROCEDURE — 80053 COMPREHEN METABOLIC PANEL: CPT

## 2018-12-16 PROCEDURE — 86780 TREPONEMA PALLIDUM: CPT

## 2018-12-16 PROCEDURE — 80307 DRUG TEST PRSMV CHEM ANLYZR: CPT

## 2018-12-16 PROCEDURE — 770002 HCHG ROOM/CARE - OB PRIVATE (112)

## 2018-12-16 PROCEDURE — 86900 BLOOD TYPING SEROLOGIC ABO: CPT

## 2018-12-16 PROCEDURE — 86147 CARDIOLIPIN ANTIBODY EA IG: CPT

## 2018-12-16 PROCEDURE — 83036 HEMOGLOBIN GLYCOSYLATED A1C: CPT

## 2018-12-16 PROCEDURE — 36415 COLL VENOUS BLD VENIPUNCTURE: CPT

## 2018-12-16 PROCEDURE — 86850 RBC ANTIBODY SCREEN: CPT

## 2018-12-16 PROCEDURE — 85025 COMPLETE CBC W/AUTO DIFF WBC: CPT

## 2018-12-16 PROCEDURE — 304964 HCHG RECOVERY ROOM TIME 1HR: Performed by: OBSTETRICS & GYNECOLOGY

## 2018-12-16 PROCEDURE — 700105 HCHG RX REV CODE 258

## 2018-12-16 PROCEDURE — 85520 HEPARIN ASSAY: CPT

## 2018-12-16 PROCEDURE — 85730 THROMBOPLASTIN TIME PARTIAL: CPT | Mod: 91

## 2018-12-16 PROCEDURE — 87086 URINE CULTURE/COLONY COUNT: CPT

## 2018-12-16 PROCEDURE — 84443 ASSAY THYROID STIM HORMONE: CPT

## 2018-12-16 PROCEDURE — 700111 HCHG RX REV CODE 636 W/ 250 OVERRIDE (IP): Performed by: ANESTHESIOLOGY

## 2018-12-16 PROCEDURE — 305385 HCHG SURGICAL SERVICES 1/4 HOUR: Performed by: OBSTETRICS & GYNECOLOGY

## 2018-12-16 PROCEDURE — 88307 TISSUE EXAM BY PATHOLOGIST: CPT

## 2018-12-16 PROCEDURE — 306828 HCHG ANES-TIME GENERAL: Performed by: OBSTETRICS & GYNECOLOGY

## 2018-12-16 PROCEDURE — 85613 RUSSELL VIPER VENOM DILUTED: CPT

## 2018-12-16 PROCEDURE — 85460 HEMOGLOBIN FETAL: CPT | Mod: 91

## 2018-12-16 PROCEDURE — 85384 FIBRINOGEN ACTIVITY: CPT

## 2018-12-16 PROCEDURE — 85610 PROTHROMBIN TIME: CPT | Mod: 91

## 2018-12-16 RX ORDER — MISOPROSTOL 200 UG/1
800 TABLET ORAL
Status: DISCONTINUED | OUTPATIENT
Start: 2018-12-16 | End: 2018-12-19 | Stop reason: HOSPADM

## 2018-12-16 RX ORDER — SODIUM CHLORIDE, SODIUM LACTATE, POTASSIUM CHLORIDE, CALCIUM CHLORIDE 600; 310; 30; 20 MG/100ML; MG/100ML; MG/100ML; MG/100ML
1000 INJECTION, SOLUTION INTRAVENOUS CONTINUOUS
Status: DISCONTINUED | OUTPATIENT
Start: 2018-12-16 | End: 2018-12-16

## 2018-12-16 RX ORDER — SODIUM CHLORIDE, SODIUM GLUCONATE, SODIUM ACETATE, POTASSIUM CHLORIDE AND MAGNESIUM CHLORIDE 526; 502; 368; 37; 30 MG/100ML; MG/100ML; MG/100ML; MG/100ML; MG/100ML
1500 INJECTION, SOLUTION INTRAVENOUS ONCE
Status: COMPLETED | OUTPATIENT
Start: 2018-12-16 | End: 2018-12-16

## 2018-12-16 RX ORDER — SODIUM CHLORIDE, SODIUM GLUCONATE, SODIUM ACETATE, POTASSIUM CHLORIDE AND MAGNESIUM CHLORIDE 526; 502; 368; 37; 30 MG/100ML; MG/100ML; MG/100ML; MG/100ML; MG/100ML
INJECTION, SOLUTION INTRAVENOUS
Status: COMPLETED
Start: 2018-12-16 | End: 2018-12-16

## 2018-12-16 RX ORDER — DIPHENHYDRAMINE HYDROCHLORIDE 50 MG/ML
12.5 INJECTION INTRAMUSCULAR; INTRAVENOUS EVERY 6 HOURS PRN
Status: ACTIVE | OUTPATIENT
Start: 2018-12-16 | End: 2018-12-17

## 2018-12-16 RX ORDER — ONDANSETRON 2 MG/ML
4 INJECTION INTRAMUSCULAR; INTRAVENOUS EVERY 6 HOURS PRN
Status: ACTIVE | OUTPATIENT
Start: 2018-12-16 | End: 2018-12-17

## 2018-12-16 RX ORDER — DOCUSATE SODIUM 100 MG/1
100 CAPSULE, LIQUID FILLED ORAL 2 TIMES DAILY PRN
Status: DISCONTINUED | OUTPATIENT
Start: 2018-12-16 | End: 2018-12-19 | Stop reason: HOSPADM

## 2018-12-16 RX ORDER — KETOROLAC TROMETHAMINE 30 MG/ML
30 INJECTION, SOLUTION INTRAMUSCULAR; INTRAVENOUS EVERY 6 HOURS
Status: ACTIVE | OUTPATIENT
Start: 2018-12-17 | End: 2018-12-17

## 2018-12-16 RX ORDER — OXYCODONE HYDROCHLORIDE 5 MG/1
5 TABLET ORAL EVERY 4 HOURS PRN
Status: ACTIVE | OUTPATIENT
Start: 2018-12-16 | End: 2018-12-17

## 2018-12-16 RX ORDER — DIPHENHYDRAMINE HYDROCHLORIDE 50 MG/ML
25 INJECTION INTRAMUSCULAR; INTRAVENOUS EVERY 6 HOURS PRN
Status: ACTIVE | OUTPATIENT
Start: 2018-12-16 | End: 2018-12-17

## 2018-12-16 RX ORDER — ACETAMINOPHEN 500 MG
1000 TABLET ORAL EVERY 6 HOURS
Status: DISPENSED | OUTPATIENT
Start: 2018-12-16 | End: 2018-12-17

## 2018-12-16 RX ORDER — OXYCODONE HYDROCHLORIDE 10 MG/1
10 TABLET ORAL EVERY 4 HOURS PRN
Status: DISPENSED | OUTPATIENT
Start: 2018-12-16 | End: 2018-12-17

## 2018-12-16 RX ORDER — SODIUM CHLORIDE, SODIUM LACTATE, POTASSIUM CHLORIDE, CALCIUM CHLORIDE 600; 310; 30; 20 MG/100ML; MG/100ML; MG/100ML; MG/100ML
INJECTION, SOLUTION INTRAVENOUS CONTINUOUS
Status: DISCONTINUED | OUTPATIENT
Start: 2018-12-16 | End: 2018-12-19 | Stop reason: HOSPADM

## 2018-12-16 RX ORDER — ONDANSETRON 2 MG/ML
4 INJECTION INTRAMUSCULAR; INTRAVENOUS
Status: DISCONTINUED | OUTPATIENT
Start: 2018-12-16 | End: 2018-12-16

## 2018-12-16 RX ORDER — CITRIC ACID/SODIUM CITRATE 334-500MG
30 SOLUTION, ORAL ORAL ONCE
Status: COMPLETED | OUTPATIENT
Start: 2018-12-16 | End: 2018-12-16

## 2018-12-16 RX ORDER — DIPHENHYDRAMINE HYDROCHLORIDE 50 MG/ML
12.5 INJECTION INTRAMUSCULAR; INTRAVENOUS
Status: DISCONTINUED | OUTPATIENT
Start: 2018-12-16 | End: 2018-12-16

## 2018-12-16 RX ORDER — HALOPERIDOL 5 MG/ML
1 INJECTION INTRAMUSCULAR
Status: DISCONTINUED | OUTPATIENT
Start: 2018-12-16 | End: 2018-12-16

## 2018-12-16 RX ORDER — MEPERIDINE HYDROCHLORIDE 25 MG/ML
6.25 INJECTION INTRAMUSCULAR; INTRAVENOUS; SUBCUTANEOUS
Status: DISCONTINUED | OUTPATIENT
Start: 2018-12-16 | End: 2018-12-16

## 2018-12-16 RX ORDER — OXYCODONE HCL 5 MG/5 ML
5 SOLUTION, ORAL ORAL
Status: DISCONTINUED | OUTPATIENT
Start: 2018-12-16 | End: 2018-12-16

## 2018-12-16 RX ORDER — ONDANSETRON 2 MG/ML
INJECTION INTRAMUSCULAR; INTRAVENOUS
Status: COMPLETED
Start: 2018-12-16 | End: 2018-12-16

## 2018-12-16 RX ORDER — SODIUM CHLORIDE, SODIUM LACTATE, POTASSIUM CHLORIDE, CALCIUM CHLORIDE 600; 310; 30; 20 MG/100ML; MG/100ML; MG/100ML; MG/100ML
INJECTION, SOLUTION INTRAVENOUS CONTINUOUS
Status: DISCONTINUED | OUTPATIENT
Start: 2018-12-16 | End: 2018-12-16

## 2018-12-16 RX ORDER — SODIUM CHLORIDE, SODIUM LACTATE, POTASSIUM CHLORIDE, CALCIUM CHLORIDE 600; 310; 30; 20 MG/100ML; MG/100ML; MG/100ML; MG/100ML
INJECTION, SOLUTION INTRAVENOUS PRN
Status: DISCONTINUED | OUTPATIENT
Start: 2018-12-16 | End: 2018-12-19 | Stop reason: HOSPADM

## 2018-12-16 RX ORDER — OXYCODONE HCL 5 MG/5 ML
10 SOLUTION, ORAL ORAL
Status: DISCONTINUED | OUTPATIENT
Start: 2018-12-16 | End: 2018-12-16

## 2018-12-16 RX ORDER — METOCLOPRAMIDE HYDROCHLORIDE 5 MG/ML
10 INJECTION INTRAMUSCULAR; INTRAVENOUS ONCE
Status: COMPLETED | OUTPATIENT
Start: 2018-12-16 | End: 2018-12-16

## 2018-12-16 RX ADMIN — Medication 125 ML/HR: at 18:39

## 2018-12-16 RX ADMIN — METOCLOPRAMIDE 10 MG: 5 INJECTION, SOLUTION INTRAMUSCULAR; INTRAVENOUS at 15:43

## 2018-12-16 RX ADMIN — SODIUM CHLORIDE, SODIUM GLUCONATE, SODIUM ACETATE, POTASSIUM CHLORIDE AND MAGNESIUM CHLORIDE 500 ML: 526; 502; 368; 37; 30 INJECTION, SOLUTION INTRAVENOUS at 15:46

## 2018-12-16 RX ADMIN — SODIUM CITRATE AND CITRIC ACID MONOHYDRATE 30 ML: 500; 334 SOLUTION ORAL at 15:46

## 2018-12-16 RX ADMIN — ONDANSETRON 4 MG: 2 INJECTION INTRAMUSCULAR; INTRAVENOUS at 21:17

## 2018-12-16 RX ADMIN — FAMOTIDINE 20 MG: 10 INJECTION INTRAVENOUS at 15:44

## 2018-12-16 RX ADMIN — SODIUM CHLORIDE, SODIUM GLUCONATE, SODIUM ACETATE, POTASSIUM CHLORIDE AND MAGNESIUM CHLORIDE 1500 ML: 526; 502; 368; 37; 30 INJECTION, SOLUTION INTRAVENOUS at 14:46

## 2018-12-16 ASSESSMENT — LIFESTYLE VARIABLES
EVER_SMOKED: NEVER
ALCOHOL_USE: NO

## 2018-12-16 ASSESSMENT — COPD QUESTIONNAIRES
DO YOU EVER COUGH UP ANY MUCUS OR PHLEGM?: NO/ONLY WITH OCCASIONAL COLDS OR INFECTIONS
DURING THE PAST 4 WEEKS HOW MUCH DID YOU FEEL SHORT OF BREATH: NONE/LITTLE OF THE TIME
IN THE PAST 12 MONTHS DO YOU DO LESS THAN YOU USED TO BECAUSE OF YOUR BREATHING PROBLEMS: DISAGREE/UNSURE
HAVE YOU SMOKED AT LEAST 100 CIGARETTES IN YOUR ENTIRE LIFE: NO/DON'T KNOW
COPD SCREENING SCORE: 0

## 2018-12-16 ASSESSMENT — PATIENT HEALTH QUESTIONNAIRE - PHQ9
1. LITTLE INTEREST OR PLEASURE IN DOING THINGS: NOT AT ALL
SUM OF ALL RESPONSES TO PHQ9 QUESTIONS 1 AND 2: 0
SUM OF ALL RESPONSES TO PHQ9 QUESTIONS 1 AND 2: 0
2. FEELING DOWN, DEPRESSED, IRRITABLE, OR HOPELESS: NOT AT ALL
1. LITTLE INTEREST OR PLEASURE IN DOING THINGS: NOT AT ALL
2. FEELING DOWN, DEPRESSED, IRRITABLE, OR HOPELESS: NOT AT ALL

## 2018-12-16 NOTE — CONSULTS
DATE OF SERVICE:  2018    This is also going to end up being a preoperative history and physical for her   return.    HISTORY OF PRESENT ILLNESS:  Patient is a 33-year-old female  7, para   6, at 29-1/7th weeks' gestation, EDC of 2019, who presents to labor and   delivery with decreased fetal movement x2 days.  Patient has not felt her baby   move for 2 days.  She did feel her baby move on Friday morning.  Patient is   being seen by Dr. Chip Lerma, but she has not been seen in approximately 2   months since October.  She was no-show at her last visit and she has no   upcoming appointments set up.  Her prenatal course has been otherwise fairly   uneventful.  She had early entry to care 9 weeks.  She had a normal quadruple   screen and a normal fetal survey.  She has not yet done her prenatal panel,   but did have a quadruple screen, which was normal.  The patient has no known   medical problems.    Upon arrival, I was notified to come and see patient as 2 different nurses   could not get fetal heart tones.  Upon my arrival, ultrasound was performed at   the bedside, which revealed no fetal cardiac activity, no tone on the baby,   the baby is in breech presentation.    PAST MEDICAL HISTORY:  None.    PAST SURGICAL HISTORY:   x3 as well as umbilical hernia repair.    ALLERGIES:  None.    FAMILY HISTORY:  Diabetes, breast cancer.    GYNECOLOGIC HISTORY:  Noncontributory.  Denies abnormal paps or STDs.  No   history of herpes simplex virus.    CURRENT MEDICATIONS:  Include only prenatal vitamins.    SOCIAL HISTORY:  She is .  She had alcohol use early pregnancy.  She   has had marijuana use throughout the pregnancy.  Denies tobacco use and   alcohol use beyond early first trimester.  She denies any other illicit drug   use.    OBSTETRICAL HISTORY:  She is a  7, para 6.  Three prior C-sections were   the last 3 deliveries.  The last baby was premature at 36 weeks.  The rest of    her babies were born vaginally.  They are all well and healthy.    PHYSICAL EXAMINATION:  VITAL SIGNS:  Stable.  Blood pressure was 115/72, pulse was 86.  GENERAL:  She has affect appropriate for the event.  CARDIOVASCULAR:  Regular rate and rhythm.  CHEST:  Clear to auscultation bilaterally.  ABDOMEN:  Gravid, nontender, nondistended.  Normal bowel sounds.  No right   upper quadrant pain.  EXTREMITIES:  No cyanosis, clubbing, or edema.  PELVIC:  Sterile vaginal exam was deferred.  Fetal cardiac activity was not   able to be found.  She was not cameron.  Bedside ultrasound done, which   revealed fetus in breech presentation, no fetal cardiac activity noted, no   fetal tone noted, likely baby has been gone for some time.    LABORATORY DATA:  Patient did not leave a urine sample today.  Additionally,   she has not yet had her prenatal panel done.  All I have is a quadruple   screen, which was normal.    ASSESSMENT AND PLAN:  A 33-year-old female  7, para 6, who presents at   29-1/7th weeks' gestation with fetal demise.  1.  Fetal demise was confirmed by bedside ultrasound.  Approximately ____ 60   minutes was spent with the patient and her  at the bedside in further   counseling and reviewing her options.  Unfortunately, at this point, baby is   breech.  She has previous C-sections x3 and will need to undergo a    for delivery.  She originally had wanted to have permanent sterilization;   however, I will re-review this with her upon her return to labor and delivery   for her scheduled , but would suspect with this news, would plan to   not tie her tubes.  Risks, benefits, and alternatives were discussed with her   regarding the , risks including bleeding, infection, pain, injury to   bowel, bladder, uterus, fallopian tubes, ovaries, major blood vessels or   nerves.  She does accept the use of blood products in the event of hemorrhage.    She will need a little bit further  workup with prenatal panel, urine   toxicology, thyroid studies, hemoglobin A1c, and coags upon arrival.  The   patient has elected to go home to get her family and her children arranged and   get her things and then return this afternoon for scheduled event.  She will   be n.p.o. for 6 hours prior to that.  At this point in time, she had eaten a   full breakfast as well, so I agree that that was probably in her best interest   to go to at this point.  2.  Insufficient prenatal care.  She has had a lapsing care, but does have a   normal fetal survey.  There was a 9-week difference in her first to her second   visit and then she has not been seen in the office since end of October and   has no set appointment to be seen at this point.  3.  Marijuana use as well as early use of alcohol.  We will plan for urine tox   upon arrival.  4.  We will readdress her desire for sterilization at this point, but would   prefer not to proceed with sterilization under these circumstances, but we   will readdress with her and her  when they return.       ____________________________________     MD CHET HARDIN / NTS    DD:  12/16/2018 10:20:39  DT:  12/16/2018 11:28:18    D#:  [id number]  Job#:  [id number]

## 2018-12-16 NOTE — PROGRESS NOTES
Patient comes in with complaints of decreased fetal movement.  Denies any leaking or bleeding.  She states that she occasionally does get contractions.  She did not feel baby yesterday or this morning.  Unable to find fetal heart tones.  Dr Rudolph called and at bedside for US. No fetal heart tones seen.  Patient and FOB given time to process. Dr Rudolph returned to discuss POC.  Patient to go home to arrange for childcare and FOB has to call into work.  Patient to return at 1400 for 1600 c/s. Instructed not to eat or drink and to return for any other symptoms.

## 2018-12-17 LAB
ERYTHROCYTE [DISTWIDTH] IN BLOOD BY AUTOMATED COUNT: 46.1 FL (ref 35.9–50)
HCT VFR BLD AUTO: 30.2 % (ref 37–47)
HGB BLD-MCNC: 9.9 G/DL (ref 12–16)
MCH RBC QN AUTO: 29.3 PG (ref 27–33)
MCHC RBC AUTO-ENTMCNC: 32.8 G/DL (ref 33.6–35)
MCV RBC AUTO: 89.3 FL (ref 81.4–97.8)
PATHOLOGY CONSULT NOTE: NORMAL
PLATELET # BLD AUTO: 246 K/UL (ref 164–446)
PMV BLD AUTO: 10.7 FL (ref 9–12.9)
RBC # BLD AUTO: 3.38 M/UL (ref 4.2–5.4)
WBC # BLD AUTO: 13.9 K/UL (ref 4.8–10.8)

## 2018-12-17 PROCEDURE — 700105 HCHG RX REV CODE 258: Performed by: OBSTETRICS & GYNECOLOGY

## 2018-12-17 PROCEDURE — A9270 NON-COVERED ITEM OR SERVICE: HCPCS | Performed by: ANESTHESIOLOGY

## 2018-12-17 PROCEDURE — 36415 COLL VENOUS BLD VENIPUNCTURE: CPT

## 2018-12-17 PROCEDURE — A9270 NON-COVERED ITEM OR SERVICE: HCPCS | Performed by: OBSTETRICS & GYNECOLOGY

## 2018-12-17 PROCEDURE — 3E02340 INTRODUCTION OF INFLUENZA VACCINE INTO MUSCLE, PERCUTANEOUS APPROACH: ICD-10-PCS | Performed by: OBSTETRICS & GYNECOLOGY

## 2018-12-17 PROCEDURE — 90686 IIV4 VACC NO PRSV 0.5 ML IM: CPT | Performed by: OBSTETRICS & GYNECOLOGY

## 2018-12-17 PROCEDURE — 700102 HCHG RX REV CODE 250 W/ 637 OVERRIDE(OP): Performed by: OBSTETRICS & GYNECOLOGY

## 2018-12-17 PROCEDURE — 700102 HCHG RX REV CODE 250 W/ 637 OVERRIDE(OP): Performed by: ANESTHESIOLOGY

## 2018-12-17 PROCEDURE — 700111 HCHG RX REV CODE 636 W/ 250 OVERRIDE (IP): Performed by: OBSTETRICS & GYNECOLOGY

## 2018-12-17 PROCEDURE — 770002 HCHG ROOM/CARE - OB PRIVATE (112)

## 2018-12-17 PROCEDURE — 85027 COMPLETE CBC AUTOMATED: CPT

## 2018-12-17 PROCEDURE — 700111 HCHG RX REV CODE 636 W/ 250 OVERRIDE (IP)

## 2018-12-17 PROCEDURE — 90471 IMMUNIZATION ADMIN: CPT

## 2018-12-17 RX ORDER — IBUPROFEN 600 MG/1
600 TABLET ORAL EVERY 6 HOURS PRN
Status: DISCONTINUED | OUTPATIENT
Start: 2018-12-17 | End: 2018-12-19 | Stop reason: HOSPADM

## 2018-12-17 RX ORDER — LISINOPRIL 2.5 MG/1
2.5 TABLET ORAL
Status: CANCELLED | OUTPATIENT
Start: 2018-12-17

## 2018-12-17 RX ORDER — KETOROLAC TROMETHAMINE 30 MG/ML
INJECTION, SOLUTION INTRAMUSCULAR; INTRAVENOUS
Status: COMPLETED
Start: 2018-12-17 | End: 2018-12-17

## 2018-12-17 RX ADMIN — KETOROLAC TROMETHAMINE 30 MG: 30 INJECTION, SOLUTION INTRAMUSCULAR at 06:36

## 2018-12-17 RX ADMIN — SODIUM CHLORIDE, POTASSIUM CHLORIDE, SODIUM LACTATE AND CALCIUM CHLORIDE: 600; 310; 30; 20 INJECTION, SOLUTION INTRAVENOUS at 01:50

## 2018-12-17 RX ADMIN — KETOROLAC TROMETHAMINE 30 MG: 30 INJECTION, SOLUTION INTRAMUSCULAR; INTRAVENOUS at 06:36

## 2018-12-17 RX ADMIN — SODIUM CHLORIDE, POTASSIUM CHLORIDE, SODIUM LACTATE AND CALCIUM CHLORIDE: 600; 310; 30; 20 INJECTION, SOLUTION INTRAVENOUS at 04:45

## 2018-12-17 RX ADMIN — ACETAMINOPHEN 1000 MG: 500 TABLET ORAL at 16:44

## 2018-12-17 RX ADMIN — OXYCODONE HYDROCHLORIDE 10 MG: 10 TABLET ORAL at 19:08

## 2018-12-17 RX ADMIN — IBUPROFEN 600 MG: 600 TABLET, FILM COATED ORAL at 23:38

## 2018-12-17 RX ADMIN — KETOROLAC TROMETHAMINE 30 MG: 30 INJECTION, SOLUTION INTRAMUSCULAR at 00:27

## 2018-12-17 RX ADMIN — KETOROLAC TROMETHAMINE 30 MG: 30 INJECTION, SOLUTION INTRAMUSCULAR; INTRAVENOUS at 00:27

## 2018-12-17 RX ADMIN — INFLUENZA A VIRUS A/MICHIGAN/45/2015 X-275 (H1N1) ANTIGEN (FORMALDEHYDE INACTIVATED), INFLUENZA A VIRUS A/SINGAPORE/INFIMH-16-0019/2016 IVR-186 (H3N2) ANTIGEN (FORMALDEHYDE INACTIVATED), INFLUENZA B VIRUS B/PHUKET/3073/2013 ANTIGEN (FORMALDEHYDE INACTIVATED), AND INFLUENZA B VIRUS B/MARYLAND/15/2016 BX-69A ANTIGEN (FORMALDEHYDE INACTIVATED) 0.5 ML: 15; 15; 15; 15 INJECTION, SUSPENSION INTRAMUSCULAR at 12:32

## 2018-12-17 RX ADMIN — IBUPROFEN 600 MG: 600 TABLET, FILM COATED ORAL at 12:46

## 2018-12-17 ASSESSMENT — PATIENT HEALTH QUESTIONNAIRE - PHQ9
1. LITTLE INTEREST OR PLEASURE IN DOING THINGS: NOT AT ALL
2. FEELING DOWN, DEPRESSED, IRRITABLE, OR HOPELESS: NOT AT ALL
SUM OF ALL RESPONSES TO PHQ9 QUESTIONS 1 AND 2: 0

## 2018-12-17 ASSESSMENT — PAIN SCALES - GENERAL
PAINLEVEL_OUTOF10: 7
PAINLEVEL_OUTOF10: 7

## 2018-12-17 NOTE — CARE PLAN
Problem: Venous Thromboembolism (VTW)/Deep Vein Thrombosis (DVT) Prevention:  Goal: Patient will participate in Venous Thrombosis (VTE)/Deep Vein Thrombosis (DVT)Prevention Measures    Intervention: Ensure patient wears graduated elastic stockings (PARVEZ hose) and/or SCDs, if ordered, when in bed or chair (Remove at least once per shift for skin check)  Pt ambulating around room, SCD's off per pt request and educated to ambulate often      Problem: Bowel/Gastric:  Goal: Will not experience complications related to bowel motility    Intervention: Assess baseline bowel pattern  Pt has not passed flatus yet, will continue to monitor.

## 2018-12-17 NOTE — PROGRESS NOTES
POD#1    Pt is still holding infant, has been ambulatory, tolerating food, no flatus yet. Pain well controlled    Afebrile  abd soft and not tender  Gyn light flow  Hct 30.2    Stable post op    Advance diet, watch for bowel function

## 2018-12-17 NOTE — PROGRESS NOTES
"0700: Report received from RONALD Harmon RN. POC discussed.  0735: Dr Lerma at bedside, OK to advance diet.  1030: Birth Certificate complete, pt calling around to  homes - pt states she is \" overwhelmed with how expensive it is to bury her baby.\" RN called social work- Rosario- will come see pt and bring number for a burial assistance program she can apply for.   1220: Pt requesting Ibuprofen instead of Toradol, RN called Dr. Lerma- orders received for Ibuprofen- 600mg q6h PRN (See MAR).   1230: Flu shot given in R Deltoid (see MAR)  1700: Pt requesting shower, IV DC'd, OK to take dressing off and wash area with warm water and to dry area completely and leave open to air.   1900: Report given to JG Davis. POC discussed  "

## 2018-12-17 NOTE — OP REPORT
DATE OF SERVICE:  2018    PREOPERATIVE DIAGNOSES:  1.  Fetal demise at 29 weeks' gestation.  2.  Previous  section x3, for repeat.  3.  Fetus in breech position.    POSTOPERATIVE DIAGNOSES:  1.  Fetal demise at 29 weeks' gestation.  2.  Previous  section x3, for repeat.  3.  Fetus in breech position.  4.  Hypercoiled cord along the length of the umbilical cord cut off the blood   supply of the cord.    PROCEDURE:  Repeat low transverse  section via Pfannenstiel skin   incision.    SURGEON:  Yina Rudolph MD    ASSISTANT:  Catrachito Henriquez MD    ANESTHESIOLOGIST:  London Gonzalez MD    ANESTHESIA:  Spinal.    COMPLICATIONS:  None.    ESTIMATED BLOOD LOSS:  600 mL.    FINDINGS:  Male infant, breech presentation, converted to vertex for delivery   and delivered vertex.  Hypercoiled cord from the insertion at the baby all the   way down to insertion of the placenta, there was approximately a 1.5 cm   segment at the insertion of the placenta that was completely void of any blood   in the umbilical cord.  Intact placenta, 3-vessel cord, bilateral normal   fallopian tubes and ovaries.  Female infant, no anomalies seen, complications   again none.    DISPOSITION:  Patient went to recovery room stable.    DESCRIPTION OF PROCEDURE:  Patient was taken to the operating room where Dr. Gonzalez placed spinal anesthesia without any difficulty.  She was prepped   and draped in the normal sterile fashion in dorsal supine position with   leftward tilt.  Timeout was called to identify correct patient, correct   procedure and confirm she had gotten preoperative antibiotics.  A Pfannenstiel   skin incision was made with a scalpel and carried down to the underlying   layer of fascia with a knife.  The fascia was incised in the midline and   incision was carried out laterally with Bruce scissors.  The upper edge of the   fascia was grasped with Kocher clamps, tented up, and the underlying rectus    muscle dissected up bluntly.  The lower edge of the fascia was grasped with   Kocher clamps, tented up, and the underlying rectus muscle dissected up   bluntly.  The rectus muscles were  in midline.  The peritoneum was   identified and entered and the incision was extended.  The Vick O retractor   was then placed without any difficulty.  There were some filmy adhesions along   the bladder flap area.  Bladder flap was then created and the lower uterine   segment was incised in a low transverse fashion with a scalpel and extended   with bandage scissors and fingers.  The fetus was still in the amniotic sac   and able to rotate the baby within the sac from breech to vertex.  The water   was broken and clear fluid and the baby was delivered in cephalic presentation   without any complications or difficulty.  Immediately noted that the cord was   super hypercoiled and had cut off the blood supply at the junction of the   umbilicus as well as at the junction of insertion site at the placenta.  There   was marked edema around the cord.  The baby looked perfect.  No findings of   abnormalities.  The cord was clamped x2 and cut and baby was handed off to   awaiting delivery team nurse.  Manual extraction of an intact placenta,   3-vessel cord.  Again, the cord was hypercoiled.  The uterus was cleared of   all clots and debris.  The hysterotomy was closed in a 2-layered fashion with   0 chromic.  Good hemostasis was achieved.  Bilateral normal fallopian tubes   and ovaries were visualized.  The Vick O retractor was then removed.  The   peritoneum was then closed with 3-0 Vicryl.  There was a small vessel bleeding   underneath the rectus muscles, which was cauterized and then suture ligated   for further hemostasis.  Once hemostasis was assured, 1 figure-of-eight was   placed through the rectus muscles.  Daryl was placed along the muscles where   dissection occurred that caused a little bit of ooziness.  The  fascia was   closed with 0 Vicryl from either end and tied in the middle.  The incision was   irrigated.  The subcutaneous layer was closed with 3-0 Vicryl and the   subcuticular layer was closed with 4-0 Monocryl.  Sponge, laps and needle   counts were correct x3.  Mother and baby were both transported to her room for   recovery.       ____________________________________     MD CHET HARDIN / GIOVANNI    DD:  12/16/2018 18:21:02  DT:  12/16/2018 19:18:31    D#:  [id number]  Job#:  [id number]

## 2018-12-17 NOTE — PROGRESS NOTES
1900_Assumed pt care. Report from VILMA Gambino RN. POC discussed with pt and understanding verbalized.  2105_Contacted ISAI Barton about  coming tonight to bless baby.   2145_Priantione @ bedside  0700_ Report to QUYNH Cunha RN.

## 2018-12-17 NOTE — PROGRESS NOTES
Late entry    1409- Pt presents to unit for pre-op for repeat  for fetal demise in breech presentation.   1651- Pt in OR   1720- Delivery of fetal demise  172- Delivery of placenta  1806- Out OR  180-Pt brought to room 226 to recover.VSS. Pt denies any significant pain at this time.   190- Report given to JG Harmon

## 2018-12-17 NOTE — OR SURGEON
Immediate Post OP Note    PreOp Diagnosis: Fetal demise 29 weeks, Prev c/s x 3 for repeat, Fetus in Breech position    PostOp Diagnosis: same plus - Hyper-coiled cord along the length of the umbilical cord cut off blood supply to cord  Procedure(s):  REPEAT LTC SECTION - Wound Class: Clean Contaminated    Surgeon(s):  SERENITY Aldana M.D.    Anesthesiologist/Type of Anesthesia:  Anesthesiologist: London Gonzalez M.D./Spinal    Surgical Staff:  Circulator: Nahomy Gambino R.N.  Scrub Person: Jeff SIN&NICK Circulator Assistant: Christina Gutiérrez R.N.  L&NICK Baby  Nurse: Ary Bush R.N.    Specimens removed if any:  Placenta to be sent to path.     Estimated Blood Loss:600cc  Findings: Female, breech converted to vtx and delivered vtx. Hyper coiled cord from insertion at baby all the way down to insertion at placenta  Intact placenta, 3vc, lulu nml ovaries/tubes  Female infant with no anomalies seen  Complications: none      12/16/2018 6:10 PM Yina Rudolph M.D.

## 2018-12-17 NOTE — CARE PLAN
Problem: Infection  Goal: Will remain free from infection  Outcome: PROGRESSING AS EXPECTED  Pt is afebrile and no s/s of infection.     Problem: Knowledge Deficit  Goal: Knowledge of disease process/condition, treatment plan, diagnostic tests, and medications will improve  Outcome: PROGRESSING AS EXPECTED  POC discussed with pt and understanding verbalized.

## 2018-12-17 NOTE — DISCHARGE PLANNING
Medical Social Work    LSW received call from bedside RN Becca. Pt is requesting a  to bless her baby and pray with her following  for fetal demise. LSW spoke with  from [place] and he will be in around 8:30pm tonight. RN aware.
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
## 2018-12-18 LAB
BACTERIA UR CULT: NORMAL
CARDIOLIPIN IGA SER IA-ACNC: 2 APL (ref 0–11)
CARDIOLIPIN IGG SER IA-ACNC: 1 GPL (ref 0–14)
CARDIOLIPIN IGM SER IA-ACNC: 27 MPL (ref 0–12)
EST. AVERAGE GLUCOSE BLD GHB EST-MCNC: 134 MG/DL
HBA1C MFR BLD: 6.3 % (ref 0–5.6)
HIV 1+2 AB+HIV1 P24 AG SERPL QL IA: NON REACTIVE
SIGNIFICANT IND 70042: NORMAL
SITE SITE: NORMAL
SOURCE SOURCE: NORMAL

## 2018-12-18 PROCEDURE — 770002 HCHG ROOM/CARE - OB PRIVATE (112)

## 2018-12-18 PROCEDURE — A9270 NON-COVERED ITEM OR SERVICE: HCPCS | Performed by: OBSTETRICS & GYNECOLOGY

## 2018-12-18 PROCEDURE — 700102 HCHG RX REV CODE 250 W/ 637 OVERRIDE(OP): Performed by: OBSTETRICS & GYNECOLOGY

## 2018-12-18 RX ORDER — IBUPROFEN 600 MG/1
600 TABLET ORAL EVERY 6 HOURS PRN
Status: DISCONTINUED | OUTPATIENT
Start: 2018-12-18 | End: 2018-12-19 | Stop reason: HOSPADM

## 2018-12-18 RX ORDER — DIPHENHYDRAMINE HCL 25 MG
25 TABLET ORAL EVERY 6 HOURS PRN
Status: DISCONTINUED | OUTPATIENT
Start: 2018-12-18 | End: 2018-12-19 | Stop reason: HOSPADM

## 2018-12-18 RX ORDER — ONDANSETRON 2 MG/ML
4 INJECTION INTRAMUSCULAR; INTRAVENOUS EVERY 6 HOURS PRN
Status: DISCONTINUED | OUTPATIENT
Start: 2018-12-18 | End: 2018-12-19 | Stop reason: HOSPADM

## 2018-12-18 RX ORDER — KETOROLAC TROMETHAMINE 30 MG/ML
30 INJECTION, SOLUTION INTRAMUSCULAR; INTRAVENOUS EVERY 6 HOURS
Status: DISPENSED | OUTPATIENT
Start: 2018-12-18 | End: 2018-12-18

## 2018-12-18 RX ORDER — ONDANSETRON 4 MG/1
4 TABLET, ORALLY DISINTEGRATING ORAL EVERY 6 HOURS PRN
Status: DISCONTINUED | OUTPATIENT
Start: 2018-12-18 | End: 2018-12-19 | Stop reason: HOSPADM

## 2018-12-18 RX ORDER — OXYCODONE AND ACETAMINOPHEN 10; 325 MG/1; MG/1
1 TABLET ORAL EVERY 4 HOURS PRN
Status: DISCONTINUED | OUTPATIENT
Start: 2018-12-18 | End: 2018-12-19 | Stop reason: HOSPADM

## 2018-12-18 RX ORDER — DIPHENHYDRAMINE HYDROCHLORIDE 50 MG/ML
25 INJECTION INTRAMUSCULAR; INTRAVENOUS EVERY 6 HOURS PRN
Status: DISCONTINUED | OUTPATIENT
Start: 2018-12-18 | End: 2018-12-19 | Stop reason: HOSPADM

## 2018-12-18 RX ORDER — MORPHINE SULFATE 10 MG/ML
4 INJECTION, SOLUTION INTRAMUSCULAR; INTRAVENOUS
Status: DISCONTINUED | OUTPATIENT
Start: 2018-12-18 | End: 2018-12-19 | Stop reason: HOSPADM

## 2018-12-18 RX ORDER — OXYCODONE HYDROCHLORIDE AND ACETAMINOPHEN 5; 325 MG/1; MG/1
1 TABLET ORAL EVERY 4 HOURS PRN
Status: DISCONTINUED | OUTPATIENT
Start: 2018-12-18 | End: 2018-12-19 | Stop reason: HOSPADM

## 2018-12-18 RX ADMIN — OXYCODONE HYDROCHLORIDE AND ACETAMINOPHEN 1 TABLET: 10; 325 TABLET ORAL at 07:34

## 2018-12-18 RX ADMIN — OXYCODONE HYDROCHLORIDE AND ACETAMINOPHEN 1 TABLET: 10; 325 TABLET ORAL at 21:36

## 2018-12-18 RX ADMIN — IBUPROFEN 600 MG: 600 TABLET, FILM COATED ORAL at 16:51

## 2018-12-18 RX ADMIN — OXYCODONE HYDROCHLORIDE AND ACETAMINOPHEN 1 TABLET: 10; 325 TABLET ORAL at 02:21

## 2018-12-18 RX ADMIN — OXYCODONE HYDROCHLORIDE AND ACETAMINOPHEN 1 TABLET: 10; 325 TABLET ORAL at 12:31

## 2018-12-18 ASSESSMENT — PATIENT HEALTH QUESTIONNAIRE - PHQ9
2. FEELING DOWN, DEPRESSED, IRRITABLE, OR HOPELESS: NOT AT ALL
SUM OF ALL RESPONSES TO PHQ9 QUESTIONS 1 AND 2: 0
1. LITTLE INTEREST OR PLEASURE IN DOING THINGS: NOT AT ALL

## 2018-12-18 ASSESSMENT — PAIN SCALES - GENERAL
PAINLEVEL_OUTOF10: 3
PAINLEVEL_OUTOF10: 2
PAINLEVEL_OUTOF10: 3
PAINLEVEL_OUTOF10: 9
PAINLEVEL_OUTOF10: 3
PAINLEVEL_OUTOF10: 8
PAINLEVEL_OUTOF10: 7
PAINLEVEL_OUTOF10: 4
PAINLEVEL_OUTOF10: 6

## 2018-12-18 NOTE — DISCHARGE PLANNING
Met with parents this morning to discuss burial assistance.  Discussed that parents need to call [facility] Burial Assistance Services through [facility].  Phone number provided.  Parents state they do not want their daughter cremated and have already contacted [facility] to get prices.      Parents have 6 other children; ages 17, 15, 13, 12, 9, and 4.  Discussed Child Life Specialist to assist with resources and information for the children.  Parents were interested in having Child Life talk to them.  Contacted Zuhair and she will meet with family.      Parents both work at [employer] and discussed that work has been very understanding of their situation.  Offered work excuse letters.  Parents stated that would be helpful to have.  SW will assist parents with 2 work excuse letters.    ISAI explained the process of infant going [place] [facility] until they select a mortuary.  Recommended parents contact the Burial Assistance Program as soon as they can.      Support provided to parents.

## 2018-12-18 NOTE — PROGRESS NOTES
1900: Received report from QUYNH Cunha RN.     0700: Resting throughout the night, report given.

## 2018-12-18 NOTE — PROGRESS NOTES
POD#2    Pt is recovering well from surgery, however she is having trouble  from infant, still holding baby and sleeping with baby. She is also worried about burial plans and costs and affordability    Afebrile  abd soft and not tender, incision dry  Gyn lochia light    Stable post op    Will allow pt to grieve as desired with baby through they day  Will consider discharge if not this afternoon then in am

## 2018-12-18 NOTE — PROGRESS NOTES
07- Report received from JOSE Mendoza RN. POC discussed. Pt requesting  Rosario so she can discuss burial options and prices. Dr. Lerma to bedside. Pt assessed and is still sleeping with infant. Will not DC at this time until burial plan have been made and patient is mentally/emotionally stable enough to go home with .     730- Pt c/o 8/10 incisional pain. Medication given per MAR. Rosario called and will be by this AM to discuss burial/ options.     1100- Pt satisfied with assistance provided by Rosario from social work. Pt states she now knows who to contact for burial and has started that process already. Pt seems to be coming to  with the idea of letting her baby go.    1600- Spoke with patient and Dr. Lerma. Plan is for patient to DC home tomorrow. Pt states she has made arrangements with the  home and they are going to pick the baby up tomorrow. Both the patient and  expressed serious concern over not wanting to leave the baby in the morgue. RN explains that the baby may need to go to the morgue for a short time in order to be picked up by the  home and signed out properly. Pt has no other concerns at this time.     - Report to OSBALDO De Santiago RN. POC discussed.

## 2018-12-19 VITALS
TEMPERATURE: 97.4 F | RESPIRATION RATE: 18 BRPM | BODY MASS INDEX: 31.18 KG/M2 | DIASTOLIC BLOOD PRESSURE: 84 MMHG | WEIGHT: 176 LBS | HEART RATE: 96 BPM | HEIGHT: 63 IN | OXYGEN SATURATION: 97 % | SYSTOLIC BLOOD PRESSURE: 125 MMHG

## 2018-12-19 PROBLEM — D62 ACUTE BLOOD LOSS AS CAUSE OF POSTOPERATIVE ANEMIA: Status: ACTIVE | Noted: 2018-12-19

## 2018-12-19 PROBLEM — G89.18 ACUTE POST-OPERATIVE PAIN: Status: ACTIVE | Noted: 2018-12-19

## 2018-12-19 PROBLEM — O36.4XX0 FETAL DEMISE, GREATER THAN 22 WEEKS, DELIVERED, CURRENT HOSPITALIZATION: Status: ACTIVE | Noted: 2018-12-19

## 2018-12-19 PROCEDURE — 700102 HCHG RX REV CODE 250 W/ 637 OVERRIDE(OP): Performed by: OBSTETRICS & GYNECOLOGY

## 2018-12-19 PROCEDURE — A9270 NON-COVERED ITEM OR SERVICE: HCPCS | Performed by: OBSTETRICS & GYNECOLOGY

## 2018-12-19 PROCEDURE — 59514 CESAREAN DELIVERY ONLY: CPT

## 2018-12-19 RX ORDER — FERROUS SULFATE 325(65) MG
325 TABLET ORAL
Status: DISCONTINUED | OUTPATIENT
Start: 2018-12-19 | End: 2018-12-19 | Stop reason: HOSPADM

## 2018-12-19 RX ORDER — OXYCODONE HYDROCHLORIDE AND ACETAMINOPHEN 5; 325 MG/1; MG/1
1 TABLET ORAL EVERY 6 HOURS PRN
Qty: 28 TAB | Refills: 0 | Status: SHIPPED | OUTPATIENT
Start: 2018-12-19 | End: 2018-12-26

## 2018-12-19 RX ORDER — IBUPROFEN 600 MG/1
600 TABLET ORAL EVERY 6 HOURS PRN
Qty: 60 TAB | Refills: 0 | Status: SHIPPED | OUTPATIENT
Start: 2018-12-19

## 2018-12-19 RX ORDER — FERROUS SULFATE 325(65) MG
325 TABLET ORAL
Qty: 60 TAB | Refills: 0 | Status: SHIPPED | OUTPATIENT
Start: 2018-12-19

## 2018-12-19 RX ADMIN — OXYCODONE HYDROCHLORIDE AND ACETAMINOPHEN 1 TABLET: 10; 325 TABLET ORAL at 03:56

## 2018-12-19 RX ADMIN — FERROUS SULFATE TAB 325 MG (65 MG ELEMENTAL FE) 325 MG: 325 (65 FE) TAB at 08:20

## 2018-12-19 RX ADMIN — OXYCODONE AND ACETAMINOPHEN 1 TABLET: 5; 325 TABLET ORAL at 13:21

## 2018-12-19 RX ADMIN — OXYCODONE AND ACETAMINOPHEN 1 TABLET: 5; 325 TABLET ORAL at 08:22

## 2018-12-19 ASSESSMENT — PAIN SCALES - GENERAL
PAINLEVEL_OUTOF10: 0
PAINLEVEL_OUTOF10: 2
PAINLEVEL_OUTOF10: 0
PAINLEVEL_OUTOF10: 10

## 2018-12-19 NOTE — CARE PLAN
Problem: Infection  Goal: Will remain free from infection  Outcome: PROGRESSING AS EXPECTED  Pt will be assessed for s/s of infection    Problem: Pain Management  Goal: Pain level will decrease to patient's comfort goal  Outcome: PROGRESSING AS EXPECTED  Pt experiencing incisional pain. Pain assessment will continue throughout shift

## 2018-12-19 NOTE — PROGRESS NOTES
0700- Report received from OSBALDO De Santiago RN. POC discussed. Pt eating breakfast with no needs at this time. Denies pain at this time.     1250- [facility]  [facility]. [facility] aware and helped coordinate. Signed baby out. Pt still tearful and inconsolable.     1320- Patient asking for pain medication after crying. Given per MAR.     1330- Discussed discharge instructions with patient including care for incision site, vaginal discharge, pelvic rest, activity level, diet and nutrition, medications prescribed and answered all questions. Patient verbalizes understanding and knows to follow up with Dr. Lerma in 2 weeks. Pt ambulated off unit with family. Pt given mementos and all grieving support group paperwork and purple folder.

## 2018-12-19 NOTE — PROGRESS NOTES
1900 report received from AN Banuelos RN. Pt reporting slight incisional pain. Pt to call RN for full assessment after she takes a shower  2130-pt requesting pain medication. Reports pain in breasts and incision. Ice packs provided to pt.   0700-report to NA Banuelos RN

## 2018-12-19 NOTE — DISCHARGE SUMMARY
"Discharge Summary    Admission Date: 18    Discharge Date: 18    Diagnosis:Active Problems:    Acute post-operative pain    Fetal demise, greater than 22 weeks, delivered, current hospitalization    Acute blood loss as cause of postoperative anemia    Subjective: Pain controlled. Normal lochia. Eating, voiding and ambulating without difficulty. Would like to wait for discharge today until her infant has been picked up by the  home.     /70   Pulse 88   Temp 37 °C (98.6 °F) (Temporal)   Resp 18   Ht 1.6 m (5' 3\")   Wt 79.8 kg (176 lb)   SpO2 97%   BMI 31.18 kg/m²       GEN: NAD  GI:soft, NT, ND  :fundus firm and below umbilicus  EXT:no edema    Hospital Course: Misa Lin is a 34 yo  who presented at 29w1d with decreased fetal movement fo two days. She was diagnosed with fetal demise. She had a history of previous  delivery x3. She underwent a repeat low transverse  delivery and delivered a non viable female infant. No physical abnormalities noted. Placenta was sent to pathology. Patient elected for infant burial. EBL was 600cc. Post operative course was complicated by asymptomatic anemia. She was meeting all post operative goals and ready for discharge home on POD#3.     Discharge Instructions   1. Diet : general  2. Activity:   No heavy lifting, pushing, pulling more than 10 lbs for 6 weeks  Pelvic rest for 6 weeks  No driving while on narcotics.   Keep incision clean and dry. Wash with soap and water       Misa Lin Linda   Home Medication Instructions [place]:[id number]    Printed on:18 5061   Medication Information                      ferrous sulfate 325 (65 Fe) MG tablet  Take 1 Tab by mouth every morning with breakfast.             ibuprofen (MOTRIN) 600 MG Tab  Take 1 Tab by mouth every 6 hours as needed (For cramping after delivery; do not give if patient is receiving ketorolac (Toradol)).             oxyCODONE-acetaminophen (PERCOCET) 5-325 " MG Tab  Take 1 Tab by mouth every 6 hours as needed for up to 7 days.             Prenatal Multivit-Min-Fe-FA (PRE- FORMULA PO)  Take  by mouth every day.                 Follow up: 1-2 weeks    Complications:none    Boaz Graves M.D.

## 2019-02-13 ENCOUNTER — HOSPITAL ENCOUNTER (EMERGENCY)
Facility: MEDICAL CENTER | Age: 34
End: 2019-02-14
Attending: EMERGENCY MEDICINE
Payer: MEDICAID

## 2019-02-13 DIAGNOSIS — S63.266A CLOSED DISLOCATION OF FIFTH METACARPAL BONE OF RIGHT HAND: ICD-10-CM

## 2019-02-13 PROCEDURE — 96372 THER/PROPH/DIAG INJ SC/IM: CPT

## 2019-02-13 PROCEDURE — 99285 EMERGENCY DEPT VISIT HI MDM: CPT

## 2019-02-13 PROCEDURE — 26725 TREAT FINGER FRACTURE EACH: CPT

## 2019-02-13 PROCEDURE — 26700 TREAT KNUCKLE DISLOCATION: CPT

## 2019-02-13 PROCEDURE — 306637 HCHG MISC ORTHO ITEM RC 0274

## 2019-02-13 PROCEDURE — 302874 HCHG BANDAGE ACE 2 OR 3""

## 2019-02-14 ENCOUNTER — APPOINTMENT (OUTPATIENT)
Dept: RADIOLOGY | Facility: MEDICAL CENTER | Age: 34
End: 2019-02-14
Attending: EMERGENCY MEDICINE
Payer: MEDICAID

## 2019-02-14 VITALS
HEIGHT: 63 IN | HEART RATE: 72 BPM | SYSTOLIC BLOOD PRESSURE: 130 MMHG | WEIGHT: 176.37 LBS | TEMPERATURE: 99.3 F | DIASTOLIC BLOOD PRESSURE: 82 MMHG | BODY MASS INDEX: 31.25 KG/M2 | RESPIRATION RATE: 16 BRPM | OXYGEN SATURATION: 98 %

## 2019-02-14 PROCEDURE — 306637 HCHG MISC ORTHO ITEM RC 0274

## 2019-02-14 PROCEDURE — 700102 HCHG RX REV CODE 250 W/ 637 OVERRIDE(OP): Performed by: EMERGENCY MEDICINE

## 2019-02-14 PROCEDURE — 26700 TREAT KNUCKLE DISLOCATION: CPT

## 2019-02-14 PROCEDURE — 73130 X-RAY EXAM OF HAND: CPT | Mod: RT

## 2019-02-14 PROCEDURE — A9270 NON-COVERED ITEM OR SERVICE: HCPCS | Performed by: EMERGENCY MEDICINE

## 2019-02-14 PROCEDURE — 700111 HCHG RX REV CODE 636 W/ 250 OVERRIDE (IP): Performed by: EMERGENCY MEDICINE

## 2019-02-14 RX ORDER — BUPIVACAINE HYDROCHLORIDE 2.5 MG/ML
10 INJECTION, SOLUTION EPIDURAL; INFILTRATION; INTRACAUDAL ONCE
Status: COMPLETED | OUTPATIENT
Start: 2019-02-14 | End: 2019-02-14

## 2019-02-14 RX ORDER — IBUPROFEN 600 MG/1
600 TABLET ORAL ONCE
Status: COMPLETED | OUTPATIENT
Start: 2019-02-14 | End: 2019-02-14

## 2019-02-14 RX ORDER — OXYCODONE HYDROCHLORIDE AND ACETAMINOPHEN 5; 325 MG/1; MG/1
2 TABLET ORAL ONCE
Status: COMPLETED | OUTPATIENT
Start: 2019-02-14 | End: 2019-02-14

## 2019-02-14 RX ORDER — MIDAZOLAM HYDROCHLORIDE 1 MG/ML
4 INJECTION INTRAMUSCULAR; INTRAVENOUS ONCE
Status: COMPLETED | OUTPATIENT
Start: 2019-02-14 | End: 2019-02-14

## 2019-02-14 RX ADMIN — OXYCODONE AND ACETAMINOPHEN 2 TABLET: 5; 325 TABLET ORAL at 00:21

## 2019-02-14 RX ADMIN — BUPIVACAINE HYDROCHLORIDE 10 ML: 2.5 INJECTION, SOLUTION EPIDURAL; INFILTRATION; INTRACAUDAL; PERINEURAL at 00:45

## 2019-02-14 RX ADMIN — MIDAZOLAM HYDROCHLORIDE 4 MG: 1 INJECTION, SOLUTION INTRAMUSCULAR; INTRAVENOUS at 01:47

## 2019-02-14 RX ADMIN — IBUPROFEN 600 MG: 600 TABLET ORAL at 02:05

## 2019-02-14 ASSESSMENT — ENCOUNTER SYMPTOMS
FEVER: 0
ROS SKIN COMMENTS: NO WOUNDS
FOCAL WEAKNESS: 0
TINGLING: 0

## 2019-02-14 NOTE — ED TRIAGE NOTES
"Chief Complaint   Patient presents with   • Hand Injury     Was playing soccer yesterday morning, pt reports she was kicked in the R hand. Swollen, pt states unable to move her 3rd and 4th finger with minimal movement to rest of hand.      /82   Pulse (!) 106   Temp 37.4 °C (99.3 °F) (Temporal)   Resp 18   Ht 1.6 m (5' 3\")   Wt 80 kg (176 lb 5.9 oz)   LMP 01/21/2019 (Approximate)   SpO2 97%   BMI 31.24 kg/m²     Pt ambulatory to triage for above, steady on feet. Visible swelling to hand, nodule like area to outer hand, firm on palpation. +radial pulse, denies N/T. Tried ice and took ibuprofen without relief, pt states swelling has continued. Pt returned to lobby, instructed to notify staff of worsening concerns.   "
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
## 2019-02-14 NOTE — DISCHARGE INSTRUCTIONS
Keep hand elevated above your shoulders as much as possible.    FOR PAIN TAKE MOTRIN 600mg and TYLENOL 1000 EVERY 6 HOURS.
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
## 2019-02-14 NOTE — ED NOTES
Discharge instructions reviewed with patient. All questions answered. Work note provided. Pt ambulatory to exit.

## 2023-01-12 ENCOUNTER — TELEPHONE (OUTPATIENT)
Dept: SCHEDULING | Facility: IMAGING CENTER | Age: 38
End: 2023-01-12

## (undated) DEVICE — SODIUM CHL IRRIGATION 0.9% 1000ML (12EA/CA)

## (undated) DEVICE — ELECTRODE DUAL RETURN W/ CORD - (50/PK)

## (undated) DEVICE — TUBING CLEARLINK DUO-VENT - C-FLO (48EA/CA)

## (undated) DEVICE — PACK C-SECTION (2EA/CA)

## (undated) DEVICE — SUTURE 0 VICRYL PLUS CT-1 - 36 INCH (36/BX)

## (undated) DEVICE — CATHETER IV NON-SAFETY 18 GA X 1 1/4 (50/BX 4BX/CA)

## (undated) DEVICE — HEMOSTAT ARISTA PWD 5 GRAM - (5/BX)

## (undated) DEVICE — WATER IRRIG. STER. 1500 ML - (9/CA)

## (undated) DEVICE — HEAD HOLDER JUNIOR/ADULT

## (undated) DEVICE — SUTURE 0 CHROMIC CT-1 - (36/BX)

## (undated) DEVICE — SET EXTENSION WITH 2 PORTS (48EA/CA) ***PART #2C8610 IS A SUBSTITUTE*****

## (undated) DEVICE — GLOVE BIOGEL SZ 6.5 SURGICAL PF LTX (50PR/BX 4BX/CA)

## (undated) DEVICE — DETERGENT RENUZYME PLUS 10 OZ PACKET (50/BX)

## (undated) DEVICE — SUTURE 0 36 CHROMIC GUT CTX (36PK/BX)

## (undated) DEVICE — TRAY SPINAL ANESTHESIA NON-SAFETY (10/CA)

## (undated) DEVICE — GLOVE BIOGEL INDICATOR SZ 7SURGICAL PF LTX - (50/BX 4BX/CA)

## (undated) DEVICE — KIT  I.V. START (100EA/CA)

## (undated) DEVICE — SUTURE 3-0 VICRYL PLUS CT-1 - 36 INCH (36/BX)

## (undated) DEVICE — RETRACTOR O C SECTION LRY - (5/BX)